# Patient Record
Sex: FEMALE | Race: WHITE | NOT HISPANIC OR LATINO | Employment: FULL TIME | URBAN - METROPOLITAN AREA
[De-identification: names, ages, dates, MRNs, and addresses within clinical notes are randomized per-mention and may not be internally consistent; named-entity substitution may affect disease eponyms.]

---

## 2017-07-26 ENCOUNTER — AMBULATORY - HEALTHEAST (OUTPATIENT)
Dept: FAMILY MEDICINE | Facility: CLINIC | Age: 32
End: 2017-07-26

## 2017-07-26 ENCOUNTER — COMMUNICATION - HEALTHEAST (OUTPATIENT)
Dept: FAMILY MEDICINE | Facility: CLINIC | Age: 32
End: 2017-07-26

## 2017-07-26 DIAGNOSIS — D22.9 CHANGE IN MOLE: ICD-10-CM

## 2018-06-18 ENCOUNTER — COMMUNICATION - HEALTHEAST (OUTPATIENT)
Dept: FAMILY MEDICINE | Facility: CLINIC | Age: 33
End: 2018-06-18

## 2018-10-29 ENCOUNTER — RECORDS - HEALTHEAST (OUTPATIENT)
Dept: ADMINISTRATIVE | Facility: OTHER | Age: 33
End: 2018-10-29

## 2018-12-17 ENCOUNTER — OFFICE VISIT - HEALTHEAST (OUTPATIENT)
Dept: FAMILY MEDICINE | Facility: CLINIC | Age: 33
End: 2018-12-17

## 2018-12-17 DIAGNOSIS — E66.09 CLASS 1 OBESITY DUE TO EXCESS CALORIES WITHOUT SERIOUS COMORBIDITY WITH BODY MASS INDEX (BMI) OF 32.0 TO 32.9 IN ADULT: ICD-10-CM

## 2018-12-17 DIAGNOSIS — Z00.00 ROUTINE HISTORY AND PHYSICAL EXAMINATION OF ADULT: ICD-10-CM

## 2018-12-17 DIAGNOSIS — E66.811 CLASS 1 OBESITY DUE TO EXCESS CALORIES WITHOUT SERIOUS COMORBIDITY WITH BODY MASS INDEX (BMI) OF 32.0 TO 32.9 IN ADULT: ICD-10-CM

## 2018-12-17 LAB
CHOLEST SERPL-MCNC: 128 MG/DL
FASTING STATUS PATIENT QL REPORTED: YES
FASTING STATUS PATIENT QL REPORTED: YES
GLUCOSE BLD-MCNC: 93 MG/DL (ref 70–99)
HDLC SERPL-MCNC: 51 MG/DL
LDLC SERPL CALC-MCNC: 65 MG/DL
TRIGL SERPL-MCNC: 58 MG/DL
TSH SERPL DL<=0.005 MIU/L-ACNC: 1.05 UIU/ML (ref 0.3–5)

## 2018-12-17 ASSESSMENT — MIFFLIN-ST. JEOR: SCORE: 1692.4

## 2018-12-18 LAB
HPV SOURCE: NORMAL
HUMAN PAPILLOMA VIRUS 16 DNA: NEGATIVE
HUMAN PAPILLOMA VIRUS 18 DNA: NEGATIVE
HUMAN PAPILLOMA VIRUS FINAL DIAGNOSIS: NORMAL
HUMAN PAPILLOMA VIRUS OTHER HR: NEGATIVE
SPECIMEN DESCRIPTION: NORMAL

## 2019-04-23 ENCOUNTER — COMMUNICATION - HEALTHEAST (OUTPATIENT)
Dept: FAMILY MEDICINE | Facility: CLINIC | Age: 34
End: 2019-04-23

## 2019-04-23 ENCOUNTER — AMBULATORY - HEALTHEAST (OUTPATIENT)
Dept: OTHER | Facility: CLINIC | Age: 34
End: 2019-04-23

## 2019-04-29 ENCOUNTER — OFFICE VISIT - HEALTHEAST (OUTPATIENT)
Dept: FAMILY MEDICINE | Facility: CLINIC | Age: 34
End: 2019-04-29

## 2019-04-29 DIAGNOSIS — R91.8 PULMONARY NODULES: ICD-10-CM

## 2019-04-29 DIAGNOSIS — R07.89 ATYPICAL CHEST PAIN: ICD-10-CM

## 2019-04-29 DIAGNOSIS — K76.9 LIVER LESION: ICD-10-CM

## 2019-04-29 ASSESSMENT — MIFFLIN-ST. JEOR: SCORE: 1683.33

## 2019-05-29 ENCOUNTER — RECORDS - HEALTHEAST (OUTPATIENT)
Dept: ADMINISTRATIVE | Facility: OTHER | Age: 34
End: 2019-05-29

## 2019-09-04 ENCOUNTER — RECORDS - HEALTHEAST (OUTPATIENT)
Dept: ADMINISTRATIVE | Facility: OTHER | Age: 34
End: 2019-09-04

## 2019-09-12 ENCOUNTER — COMMUNICATION - HEALTHEAST (OUTPATIENT)
Dept: FAMILY MEDICINE | Facility: CLINIC | Age: 34
End: 2019-09-12

## 2019-09-13 ENCOUNTER — RECORDS - HEALTHEAST (OUTPATIENT)
Dept: GENERAL RADIOLOGY | Facility: CLINIC | Age: 34
End: 2019-09-13

## 2019-09-13 ENCOUNTER — OFFICE VISIT - HEALTHEAST (OUTPATIENT)
Dept: FAMILY MEDICINE | Facility: CLINIC | Age: 34
End: 2019-09-13
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
## 2019-09-13 DIAGNOSIS — T14.8XXA MUSCLE STRAIN: ICD-10-CM
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
## 2019-09-13 DIAGNOSIS — T14.8XXA OTHER INJURY OF UNSPECIFIED BODY REGION, INITIAL ENCOUNTER: ICD-10-CM

## 2019-09-16 ENCOUNTER — COMMUNICATION - HEALTHEAST (OUTPATIENT)
Dept: FAMILY MEDICINE | Facility: CLINIC | Age: 34
End: 2019-09-16

## 2019-09-26 ENCOUNTER — OFFICE VISIT - HEALTHEAST (OUTPATIENT)
Dept: PHYSICAL THERAPY | Facility: REHABILITATION | Age: 34
End: 2019-09-26
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
## 2019-09-26 DIAGNOSIS — M54.6 PAIN IN THORACIC SPINE: ICD-10-CM

## 2019-09-26 DIAGNOSIS — M54.2 CERVICAL SPINE PAIN: ICD-10-CM

## 2019-09-26 DIAGNOSIS — M54.50 LUMBAR SPINE PAIN: ICD-10-CM

## 2019-10-03 ENCOUNTER — OFFICE VISIT - HEALTHEAST (OUTPATIENT)
Dept: PHYSICAL THERAPY | Facility: REHABILITATION | Age: 34
End: 2019-10-03

## 2019-10-03 DIAGNOSIS — M54.6 PAIN IN THORACIC SPINE: ICD-10-CM

## 2019-10-03 DIAGNOSIS — M54.50 LUMBAR SPINE PAIN: ICD-10-CM

## 2019-10-03 DIAGNOSIS — M54.2 CERVICAL SPINE PAIN: ICD-10-CM
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
## 2019-10-08 ENCOUNTER — OFFICE VISIT - HEALTHEAST (OUTPATIENT)
Dept: PHYSICAL THERAPY | Facility: REHABILITATION | Age: 34
End: 2019-10-08

## 2019-10-08 DIAGNOSIS — M54.2 CERVICAL SPINE PAIN: ICD-10-CM
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
## 2019-10-08 DIAGNOSIS — M54.50 LUMBAR SPINE PAIN: ICD-10-CM

## 2019-10-08 DIAGNOSIS — M54.6 PAIN IN THORACIC SPINE: ICD-10-CM

## 2019-10-13 ENCOUNTER — RECORDS - HEALTHEAST (OUTPATIENT)
Dept: ADMINISTRATIVE | Facility: OTHER | Age: 34
End: 2019-10-13

## 2019-10-14 ENCOUNTER — OFFICE VISIT - HEALTHEAST (OUTPATIENT)
Dept: PHYSICAL THERAPY | Facility: REHABILITATION | Age: 34
End: 2019-10-14

## 2019-10-14 DIAGNOSIS — M54.50 LUMBAR SPINE PAIN: ICD-10-CM

## 2019-10-14 DIAGNOSIS — M54.2 CERVICAL SPINE PAIN: ICD-10-CM

## 2019-10-14 DIAGNOSIS — M54.6 PAIN IN THORACIC SPINE: ICD-10-CM
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
## 2019-10-17 ENCOUNTER — OFFICE VISIT - HEALTHEAST (OUTPATIENT)
Dept: PHYSICAL THERAPY | Facility: REHABILITATION | Age: 34
End: 2019-10-17
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
## 2019-10-17 DIAGNOSIS — M54.50 LUMBAR SPINE PAIN: ICD-10-CM

## 2019-10-17 DIAGNOSIS — M54.2 CERVICAL SPINE PAIN: ICD-10-CM

## 2019-10-17 DIAGNOSIS — M54.6 PAIN IN THORACIC SPINE: ICD-10-CM

## 2019-10-21 ENCOUNTER — OFFICE VISIT - HEALTHEAST (OUTPATIENT)
Dept: PHYSICAL THERAPY | Facility: REHABILITATION | Age: 34
End: 2019-10-21
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
## 2019-10-21 DIAGNOSIS — M54.50 LUMBAR SPINE PAIN: ICD-10-CM

## 2019-10-21 DIAGNOSIS — M54.2 CERVICAL SPINE PAIN: ICD-10-CM

## 2019-10-21 DIAGNOSIS — M54.6 PAIN IN THORACIC SPINE: ICD-10-CM

## 2019-10-29 ENCOUNTER — HOSPITAL ENCOUNTER (OUTPATIENT)
Dept: CT IMAGING | Facility: HOSPITAL | Age: 34
Discharge: HOME | End: 2019-10-29

## 2019-10-29 ENCOUNTER — HOSPITAL ENCOUNTER (OUTPATIENT)
Dept: MRI IMAGING | Facility: HOSPITAL | Age: 34
Discharge: HOME | End: 2019-10-29

## 2019-10-29 DIAGNOSIS — K76.9 LIVER LESION: ICD-10-CM

## 2019-10-29 DIAGNOSIS — R91.8 PULMONARY NODULES: ICD-10-CM

## 2019-11-05 ENCOUNTER — OFFICE VISIT - HEALTHEAST (OUTPATIENT)
Dept: FAMILY MEDICINE | Facility: CLINIC | Age: 34
End: 2019-11-05

## 2019-11-05 DIAGNOSIS — K76.89 LIVER CYST: ICD-10-CM

## 2019-11-05 DIAGNOSIS — R91.8 PULMONARY NODULES: ICD-10-CM

## 2019-11-05 DIAGNOSIS — Z00.00 ENCOUNTER FOR MEDICAL EXAMINATION TO ESTABLISH CARE: ICD-10-CM

## 2019-11-07 ENCOUNTER — OFFICE VISIT - HEALTHEAST (OUTPATIENT)
Dept: PHYSICAL THERAPY | Facility: REHABILITATION | Age: 34
End: 2019-11-07

## 2019-11-07 DIAGNOSIS — M54.6 PAIN IN THORACIC SPINE: ICD-10-CM
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
## 2019-11-07 DIAGNOSIS — M54.50 LUMBAR SPINE PAIN: ICD-10-CM

## 2019-11-07 DIAGNOSIS — M54.2 CERVICAL SPINE PAIN: ICD-10-CM

## 2019-12-05 ENCOUNTER — COMMUNICATION - HEALTHEAST (OUTPATIENT)
Dept: PHYSICAL THERAPY | Facility: REHABILITATION | Age: 34
End: 2019-12-05

## 2021-02-23 ENCOUNTER — COMMUNICATION - HEALTHEAST (OUTPATIENT)
Dept: FAMILY MEDICINE | Facility: CLINIC | Age: 36
End: 2021-02-23

## 2021-03-29 ENCOUNTER — OFFICE VISIT - HEALTHEAST (OUTPATIENT)
Dept: FAMILY MEDICINE | Facility: CLINIC | Age: 36
End: 2021-03-29

## 2021-03-29 DIAGNOSIS — N36.8: ICD-10-CM

## 2021-03-29 DIAGNOSIS — Z00.01 ENCOUNTER FOR GENERAL ADULT MEDICAL EXAMINATION WITH ABNORMAL FINDINGS: ICD-10-CM

## 2021-03-29 ASSESSMENT — MIFFLIN-ST. JEOR: SCORE: 1651.58

## 2021-04-02 ENCOUNTER — COMMUNICATION - HEALTHEAST (OUTPATIENT)
Dept: FAMILY MEDICINE | Facility: CLINIC | Age: 36
End: 2021-04-02

## 2021-05-28 NOTE — TELEPHONE ENCOUNTER
New Appointment Needed  What is the reason for the visit:    Inpatient/ED Follow Up Appt Request  At what hospital or facility were you seen?:   What is the reason you were seen?: chest pain  What date were you admitted?: date: 04/22/169  What date were you discharged?: date: 04/22/19  What was the recommended timeframe for your follow up appointment?: 1 to 2 days follow up   Provider Preference: PCP only  How soon do you need to be seen?: today if possible   Waitlist offered?: No  Okay to leave a detailed message:  Yes

## 2021-05-28 NOTE — PROGRESS NOTES
ASSESSMENT/PLAN  1. Atypical chest pain  ER note and diagnostics reviewed from 2019.  See below regarding follow-up of abnormal findings imaging.  Symptoms improving--patient will follow and return with any changes/new complaints.    2. Pulmonary nodules  Largest 6 x 2 mm left upper lobe; per current guidelines, repeat CT at 6 months and then again at 18 to 24 months.  Future order for CT placed.  6.2 and- CT Chest Without Contrast; Future    3. Liver lesion  Recommendation was to do MRI in 3 to 6 months.  Patient would like to try this and with doing follow-up CT as above.  Future order placed.  - MR Liver With Without Contrast; Future          Pt states an understanding and agrees to the above plan.  Return in about 8 months (around 2019) for Annual physical.    Much or all of the text in this note was generated through the use of Dragon Dictate voice-to-text software. Errors in spelling or words which seem out of context are unintentional.   Sound alike errors, in particular, may have escaped editing.        SUBJECTIVE:   Chief Complaint   Patient presents with     Hospital Visit Follow Up     ED f/u Chest pain, [facility], , symptoms have improved, still has some back pain     Yue Rivera 33 y.o. female    Current Outpatient Medications   Medication Sig Dispense Refill     MULTIVITAMIN ORAL Take by mouth.       No current facility-administered medications for this visit.      Allergies: Patient has no known allergies.   No LMP recorded. (Menstrual status: Contraception).    HPI:   Yue is a 33-year-old  3 para 3 comes in today for follow-up after being seen in the ED 2019.  Patient had pain in her back between her shoulder blades that increased after doing a workout and started having chest symptoms.  She admits she had just completed a CPR course and was aware that female disease may present atypically.  She decided with increasing discomfort she would go to the ER for further  "evaluation.  She was monitored in the ER and had labs completed including CBC, CMP, troponin, lipase and EKG which were all normal.  They did do imaging of her chest which discovered some pulmonary nodules as well as a liver lesions but nothing to explain the chest pain.  Patient reports chest pain resolved back pain still mildly there.  She feels she likely\" tweaked\" something with workouts.  She has been working out with a fairly vigorous exercise routine a couple times a week over the last 6 weeks.  She is backing off of Bactrim but symptoms resolved at this point.  Pain does not require any type of analgesic medication.  She is having no issues with breathing, no abdominal pain, no other new symptoms.  And is not on any new medication and denies any history of injury or trauma.    ROS: negative except as per HPI    OBJECTIVE:   The patient appears well, alert, oriented x 3, in no distress.  /62 (Patient Site: Left Arm, Patient Position: Sitting, Cuff Size: Adult Large)   Pulse 70   Temp 96.7  F (35.9  C) (Oral)   Ht 5' 8\" (1.727 m)   Wt 209 lb 6.4 oz (95 kg)   BMI 31.84 kg/m      Lungs: clear, good air entry, no wheezes, rhonchi or rales.   Cardiac: S1 and S2 normal, no murmurs, regular rate and rhythm.     15 minutes spent face-to-face with patient, greater than 50% of this in counseling regarding the above, making plans for follow-up care, and medication management.      "

## 2021-05-28 NOTE — TELEPHONE ENCOUNTER
Patient came to clinic and scheduled an appt with Dr Singh 04/29/19 at 9:30AM, appts were offered for tomorrow 04/24/19 patient declined as she wanted to see Dr Singh.
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
## 2021-06-01 NOTE — TELEPHONE ENCOUNTER
Happy to see her to establish care. If not urgent then can just schedule establish care physical in future when I have openings. If more urgent let me know

## 2021-06-02 ENCOUNTER — RECORDS - HEALTHEAST (OUTPATIENT)
Dept: ADMINISTRATIVE | Facility: CLINIC | Age: 36
End: 2021-06-02

## 2021-06-02 VITALS — HEIGHT: 68 IN | BODY MASS INDEX: 32.04 KG/M2 | WEIGHT: 211.4 LBS
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
## 2021-06-03 VITALS
TEMPERATURE: 98.3 F | WEIGHT: 207 LBS | OXYGEN SATURATION: 100 % | DIASTOLIC BLOOD PRESSURE: 68 MMHG | BODY MASS INDEX: 31.47 KG/M2 | HEART RATE: 72 BPM | RESPIRATION RATE: 12 BRPM | SYSTOLIC BLOOD PRESSURE: 116 MMHG

## 2021-06-03 VITALS
SYSTOLIC BLOOD PRESSURE: 104 MMHG | DIASTOLIC BLOOD PRESSURE: 59 MMHG | BODY MASS INDEX: 31.63 KG/M2 | HEART RATE: 68 BPM | WEIGHT: 208 LBS | RESPIRATION RATE: 16 BRPM | OXYGEN SATURATION: 99 % | TEMPERATURE: 97.7 F

## 2021-06-03 VITALS — HEIGHT: 68 IN | WEIGHT: 209.4 LBS | BODY MASS INDEX: 31.74 KG/M2
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
## 2021-06-03 NOTE — PROGRESS NOTES
ASSESSMENT & PLAN:  Yue was seen today for establish care.    Diagnoses and all orders for this visit:    Encounter for medical examination to establish care    Pulmonary nodules  -     CT Chest Without Contrast; Future    Liver cyst     -Reviewed all health history at this time.  We will plan to follow-up with patient in the next 6 months for annual physical otherwise CT scan to follow-up on pulmonary nodules was ordered that she can have done by the end of the year.  Reassured her on the liver cysts.  She will let me know if she has any further questions or concerns    There are no Patient Instructions on file for this visit.     Orders Placed This Encounter   Procedures     CT Chest Without Contrast     Standing Status:   Future     Standing Expiration Date:   11/5/2020     Order Specific Question:   Reason for Exam (Describe Symptoms):     Answer:   follow up pulmonary nodule from april 2019     Order Specific Question:   Is the patient pregnant?     Answer:   No     Order Specific Question:   Can the procedure be changed per Radiologist protocol?     Answer:   Yes     Order Specific Question:   If this is a diagnostic procedure, have the patient's age and recent imaging history been considered?     Answer:   Yes     There are no discontinued medications.    No follow-ups on file.     CHIEF COMPLAINT:  Chief Complaint   Patient presents with     \[facility]\"" Care     Review MRI and CT from October        HISTORY OF PRESENT ILLNESS:  Yue is a 33 y.o. female presenting to the clinic today for establish care.  She was previously a patient of Dr. Soumya Singh.  Her  and boys all see Dr. Keith.  She had been seen in the emergency room in the last 12 months for chest discomfort that was felt to be musculoskeletal.  A CTA was performed and incidental liver cyst and pulmonary nodules were seen.  She did have follow-up chest CT several days later that question a possible nerve sheath shadowing however the  nodule was measuring at 6 x 2 mm.  She is a non-smoker and did not grow up in a smoking household.  Dedicated liver MRI follow-up revealed likely benign cysts.   She has no other concerns at this time.  Does not take any medications.  Has an IUD Mirena in place    Chest Pain: Patient had chest pain on 4/23/19 due to muscular issues at the ED. She was training for a triathon.     Liver Cyst: Patient had a cyst was on her liver but it was not significant. There were spots in her lungs which also had no significance. She has umbilical hernia that causes no pain.     Pulmonary nodules- recommended follow-up was in 6 months and then again in 12 to 18 months.    Contraceptive: The Mirena placed in November 2016. She had no issues or mestrual cycles with it.       She is not yet due for a physical at this time.  Has had normal Pap smears.  We did not do physical exam today as she wanted to get to her son's appointment who is being seen at the same time with Dr. Keith  REVIEW OF SYSTEMS:   Please read above in the HPI. Complete head to toe review of systems is otherwise negative except as above.  She has no pertinent family history and no allergies.  No history of any surgeries   PFSH:  History reviewed. No pertinent surgical history.  Her family history includes Diabetes in her father; Diabetes type II in her maternal grandmother; Hyperlipidemia in her maternal grandmother; Hypertension in her paternal grandmother; Osteoporosis in her paternal grandmother; Thyroid disease in her mother.  She  reports that she has never smoked. She has never used smokeless tobacco. She reports that she does not drink alcohol or use drugs. Did not grew up in a smoking household. She has 3 sons that ages 7,4, and 3. She denies complications in delivery, gestational diabetes, or hypertension. Her 4 y.o. son had a fibrillar seizure recently.     TOBACCO USE:  Social History     Tobacco Use   Smoking Status Never Smoker   Smokeless Tobacco  Never Used        VITALS:  Vitals:    11/05/19 1529   BP: 104/59   Pulse: 68   Resp: 16   Temp: 97.7  F (36.5  C)   TempSrc: Oral   SpO2: 99%   Weight: 208 lb (94.3 kg)     Wt Readings from Last 3 Encounters:   11/05/19 208 lb (94.3 kg)   09/13/19 207 lb (93.9 kg)   04/29/19 209 lb 6.4 oz (95 kg)     Body mass index is 31.63 kg/m .  No LMP recorded. (Menstrual status: Contraception).       MEDICATIONS:  Current Outpatient Medications   Medication Sig Dispense Refill     levonorgestrel (MIRENA UTRN) by Intrauterine route.       MULTIVITAMIN ORAL Take by mouth.       No current facility-administered medications for this visit.        PHYSICAL EXAM:  GENERAL:  Reveals an alert mildly anxious 33 y.o. female in NAD.  Vitals:  Per nursing notes.  EYES: PERRLA. Extraocular movements intact. Normal conjunctiva and lids.   PSYCH:  Mood appropriate, memory intact.     DATA REVIEWED:  ADDITIONAL HISTORY SUMMARIZED (2): Reviewed ED of chest pain, 4/23/19.   DECISION TO OBTAIN EXTRA INFORMATION (1): None.   RADIOLOGY TESTS (1): Reviewed CT chest for chest pain, 4/29/19.   LABS (1): None.  MEDICINE TESTS (1): None.  INDEPENDENT REVIEW (2 each): None.     The visit lasted a total of 14 minutes face to face with the patient. Over 50% of the time was spent counseling and educating the patient about chest pain, liver cyst, and contraceptive.     IDamaris, am scribing for and in the presence of Dr. Coe.  IDr. Coe, personally performed the services described in this documentation, as scribed by Damaris Brenner in my presence, and it is both accurate and complete.     Total data points: 3

## 2021-06-04 NOTE — TELEPHONE ENCOUNTER
Called and left VM regarding NS #5 (some were late cancels).  Left message regarding our policy, if she would like to continue she needs a new referral from her physician.  I will cancel her remaining appointments at this time.

## 2021-06-05 ENCOUNTER — RECORDS - HEALTHEAST (OUTPATIENT)
Dept: FAMILY MEDICINE | Facility: CLINIC | Age: 36
End: 2021-06-05

## 2021-06-05 VITALS
DIASTOLIC BLOOD PRESSURE: 76 MMHG | TEMPERATURE: 97.9 F | HEIGHT: 68 IN | RESPIRATION RATE: 16 BRPM | SYSTOLIC BLOOD PRESSURE: 113 MMHG | OXYGEN SATURATION: 98 % | HEART RATE: 59 BPM | BODY MASS INDEX: 30.68 KG/M2 | WEIGHT: 202.4 LBS

## 2021-06-05 DIAGNOSIS — Z33.1 PREGNANT STATE, INCIDENTAL: ICD-10-CM

## 2021-06-16 PROBLEM — E66.09 CLASS 1 OBESITY DUE TO EXCESS CALORIES WITHOUT SERIOUS COMORBIDITY WITH BODY MASS INDEX (BMI) OF 32.0 TO 32.9 IN ADULT: Status: ACTIVE | Noted: 2018-12-17

## 2021-06-16 PROBLEM — K76.89 LIVER CYST: Status: ACTIVE | Noted: 2019-11-05

## 2021-06-16 PROBLEM — E66.811 CLASS 1 OBESITY DUE TO EXCESS CALORIES WITHOUT SERIOUS COMORBIDITY WITH BODY MASS INDEX (BMI) OF 32.0 TO 32.9 IN ADULT: Status: ACTIVE | Noted: 2018-12-17

## 2021-06-16 PROBLEM — N36.8: Status: ACTIVE | Noted: 2021-04-02

## 2021-06-16 PROBLEM — R91.8 PULMONARY NODULES: Status: ACTIVE | Noted: 2019-11-05

## 2021-06-16 NOTE — PROGRESS NOTES
FEMALE ADULT PREVENTIVE EXAM    CHIEF COMPLAINT:  Female preventive exam.    SUBJECTIVE:  Yue Rivera is a 35 y.o. female.  Changing moles on face.  No family hx skin cancers.  -sunscreen  IUD 12/2016  .  She wants to discuss when she should have it removed and replaced.  Son epilepsy had seizure before dx last visit.    One abnormal Pap smear distant past 2013.   She  has a past medical history of Abnormal Pap Smear Of Cervix and Liver cyst (11/5/2019).    Lab Results   Component Value Date    WBC 10.0 04/23/2019    HGB 13.8 04/23/2019    HCT 40.9 04/23/2019    MCV 87 04/23/2019     04/23/2019     04/23/2019    K 4.0 04/23/2019    BUN 11 04/23/2019     Lab Results   Component Value Date    CHOL 128 12/17/2018    HDL 51 12/17/2018    LDLCALC 65 12/17/2018    TRIG 58 12/17/2018     Lab Results   Component Value Date    TSH 1.05 12/17/2018     BP Readings from Last 3 Encounters:   03/29/21 113/76   11/05/19 104/59   09/13/19 116/68       Surgeries:  No past surgical history on file.    Family History:  Her family history includes Diabetes in her father; Diabetes type II in her maternal grandmother; Hyperlipidemia in her maternal grandmother; Hypertension in her paternal grandmother; Osteoporosis in her paternal grandmother; Thyroid disease in her mother.    Social History:  She  reports that she has never smoked. She has never used smokeless tobacco. She reports that she does not drink alcohol or use drugs.    Medications:    Current Outpatient Medications:      levonorgestrel (MIRENA UTRN), by Intrauterine route., Disp: , Rfl:      MULTIVITAMIN ORAL, Take by mouth., Disp: , Rfl:   HELD MEDICATIONS: None.    Allergies:  No latex allergies.  No Known Allergies    RISK BEHAVIOR & HEALTH HABITS  1 abnormal Pap smear.  Not yet due for mammography.  Recommend exercise    Guns: NO  Sun Screen: YES  Dental Care: YES    REVIEW OF SYSTEMS:  Complete head to toe review of systems is otherwise negative  except as above.    OBJECTIVE:  VITAL SIGNS:    Vitals:    03/29/21 1142   BP: 113/76   Pulse: (!) 59   Resp: 16   Temp: 97.9  F (36.6  C)   SpO2: 98%     GENERAL:  Patient alert, in no acute distress.  EYES: PERRLA. Extraoccular movements intact, pupils equal, reactive to light and accommodation.  Normal conjunctiva and lids.   ENT:  Hearing grossly normal.  Normal appearance to ears and nose.  Bilateral TM s, external canals, oropharynx normal. Normal lips, gums and teeth.  Normal nasal mucosa, septum and turbinates.  NECK:  Supple, without thyromegaly or mass.  RESP:  Clear to auscultation without crackles, wheezes or distress.  Normal respiratory effort.   CV:  Regular rate and rhythm without murmurs, rubs or gallops.  Normal carotid, abdominal aorta, femoral and pedal pulses.  No varicosities or edema.  ABDOMEN:  Soft, non-tender, without hepatosplenomegaly, masses, or hernias.   BREASTS:  Nontender, without masses, nipple discharge, erythema, or axillary adenopathy.    :  Normal pelvic exam, including external genitalia with normal appearance and no lesions.  Normal vaginal exam, including no discharge and normal pelvic support, and no lesions.    There is mild enlargement of inferior urethra consistent with [place]'s cyst.  normal bladder, with no fullness, masses or tenderness.  Cervix well visualized, with normal appearance and no lesions or discharge.  Normal uterus, including normal size, shape, mobility with no tenderness.  Normal adnexa, including no nodules, masses or tenderness.  LYMPHATIC: Normal palpation of neck, groin and axilla..  No lymphadenopathy.  No bruising.  NEURO:  CN II-XII intact, motor & sensory function all intact.  DTR and reflexes normal.  PSYCHIATRIC:  Alert & oriented with normal mood and affect.  Good judgment and insight.  SKIN:  Normal inspection and palpation.  MUSCULOSKELETAL: Normal gait and station.  - Spine / Ribs / Pelvis: Normal inspection, ROM, stability and strength:  Spine, Head, Neck, Upper and Lower Extremities.    ASSESSMENT & PLAN  Yue was seen today for annual exam and nevus.    Diagnoses and all orders for this visit:    Encounter for general adult medical examination with abnormal findings    Cyst of [place]'s duct  -     Ambulatory referral to Urology    -Normal exam at this time.  Patient will message me when she is ready to have her IUD removed and replaced.  Referral placed for [place] duct cyst but patient is asymptomatic.  She could decide to hold off on that referral for a while if she would like.  Could consider warm sitz bath's.  Could also see OB/GYN if it became enlarged or obstructive.  No recent UTIs    General  Immunizations reviewed and updated .  Alcohol use, safety and moderation discussed.  Recommended adequate calcium intake/osteoporosis prevention.  Discussed colon cancer screening at age 50, 45 if -American.  Diet and exercise reviewed, including goal of aerobic exercise 30-90 minutes most days of the week, moderation of portions sizes, avoiding eating out and fast food and increase in fruits and vegetables.  Discussed safe sex practices.  Discussed & recommended seat belt (& motorcycle helmet) use.  Discussed & recommended smoke detector.  Discussed sun protection.  Discussed weight management.

## 2021-06-17 NOTE — PATIENT INSTRUCTIONS - HE
"Patient Instructions by Martha Fraire PT at 9/26/2019  2:30 PM     Author: Martha Fraire PT Service: -- Author Type: Physical Therapist    Filed: 9/26/2019  3:13 PM Encounter Date: 9/26/2019 Status: Signed    : Martha Fraire PT (Physical Therapist)         GO BETWEEN ICE/HEAT 15 MIN ICE, 15 MIN HEAT, 15 MIN ICE, 15 HEAT.     LOWER TRUNK ROTATIONS - LTR    Lying on your back with your knees bent, gently move your knees side-to-side.    Repeat 10-20 times in the morning when you get up           Heel Slides    Lying on your back with knees bent, brace your abdominals.  Slide one heel away, extend your leg and side as far as able, slide your heel back up.    Repeat 10x on each side.        SIDELYING TRUNK ROTATION - MODIFIED    While lying on your side with your knees bent,  slowly twist your upper body to the side and rotated your spine.    Repeat 5-10 times on each side.          ISOMETRIC FLEXION    Place your fingers on your forehead and gently push your head into your fingers.    Push for 5\" then RELAX.  Push each direction 4 ways around your head.      CERVICAL EXTENSION WITH TOWEL SUPPORT    Start with a small hand towel wrapped around the base of your skull and holding the ends of the towel forward as shown. To look up at the ceiling. Then, return to starting position.     Your hands should follow with your head during the movement.    Repeat 5x            "

## 2021-06-17 NOTE — PATIENT INSTRUCTIONS - HE
"Patient Instructions by Martha Fraire PT at 10/3/2019 12:00 PM     Author: Martha Fraire PT Service: -- Author Type: Physical Therapist    Filed: 10/3/2019 12:33 PM Encounter Date: 10/3/2019 Status: Signed    : Martha Fraire PT (Physical Therapist)        BRACE MARCHING    While lying on your back with your knees bent,  slowly raise up one foot a few inches and then set it back down.  Next, perform on your other leg.  Use your stomach muscles to keep your spine from moving.    Repeat 3 sets of 10x each      BRIDGING    While lying on your back, tighten your lower abdominals, squeeze your buttocks and then raise your buttocks off the floor/bed as creating a \"Bridge\" with your body.    Repeat 2 sets of 10, you may hold each 10 seconds            "

## 2021-06-17 NOTE — PATIENT INSTRUCTIONS - HE
Patient Instructions by Martha Fraire PT at 11/7/2019  1:00 PM     Author: Martha Fraire PT Service: -- Author Type: Physical Therapist    Filed: 11/7/2019  1:29 PM Encounter Date: 11/7/2019 Status: Signed    : Martha Fraire PT (Physical Therapist)        ELASTIC BAND SHOULDER INTERNAL ROTATION    While holding an elastic band at your side with your elbow bent, start with your hand away from your stomach, then pull the band towards your stomach. Keep your elbow near your side the entire time.    Do 10-15 repetitions.      ELASTIC BAND SHOULDER EXTERNAL ROTATION    While holding an elastic band at your side with your elbow bent, start with your hand near your stomach and then pull the band away. Keep your elbow at your side the entire time.    Do 10-15 repetitions.      SERRATUS WALL SLIDE - ELASTIC BAND    Place an elastic band around your arms at the level of your wrists as shown. Next, place your forearms and hands along a wall so that your elbows are bent and your arms point towards the ceiling.     Then, protract your shoulder blades forward and then slide your arms up the wall as shown.     Return to the original position and repeat.

## 2021-06-17 NOTE — PATIENT INSTRUCTIONS - HE
"Patient Instructions by Martha Fraire PT at 10/14/2019 12:00 PM     Author: Martha Fraire PT Service: -- Author Type: Physical Therapist    Filed: 10/14/2019 12:33 PM Encounter Date: 10/14/2019 Status: Signed    : Martha Fraire PT (Physical Therapist)        PRONE RETRACTION EXTENSION - PRONE I    Lying face down with your arms by your side, slowly move your arms upward towards the ceiling as you squeeze your shoulder blades downwards and towards your spine.    Repeat 10x hold each 5\"    PRONE T - BILATERAL - THUMBS UP    Lie face down with your elbow straight and arms out to the side. Next, set your scapula by retracting it towards your spine and downward towards your feet. Then, slowly raise your arms towards the ceiling keeping your elbow straight the entire time as shown.    Repeat 5 x, hold 5\"              "

## 2021-06-17 NOTE — PATIENT INSTRUCTIONS - HE
Patient Instructions by Michelle Taylor PT at 10/8/2019  2:30 PM     Author: Michelle Taylor PT Service: -- Author Type: Physical Therapist    Filed: 10/8/2019  2:59 PM Encounter Date: 10/8/2019 Status: Signed    : Michelle Taylor PT (Physical Therapist)        Right Cervical Rotation SNAG    -Begin seated or standing  -Place towel behind the neck at the level that is bothering you   -Using the Left hand, grab the end of the towel on the Right side   -With the Right hand, grab the other end of the towel so your arms are crossed (Right over Left)  - While turning your head toward the Right, use the Right hand to gently pull towel up on a diagonal   -Return to starting position  -Repeat

## 2021-06-19 ENCOUNTER — HEALTH MAINTENANCE LETTER (OUTPATIENT)
Age: 36
End: 2021-06-19

## 2021-06-19 NOTE — LETTER
Letter by Luis Cook MD at      Author: Luis Cook MD Service: -- Author Type: --    Filed:  Encounter Date: 9/16/2019 Status: (Other)         Yue Rivera  [address]  [facility] [address]             September 16, 2019         Dear Ms. Miguel,    Below are the results from your recent visit:    Resulted Orders   XR Thoracic Spine 2 VWS    Narrative    EXAM: XR CERVICAL SPINE 2 - 3 VWS, XR THORACIC SPINE 2 VWS  LOCATION: [facility]  DATE/TIME: 9/13/2019 12:19 PM    INDICATION: Neck and back pain after trauma  COMPARISON: CTA of the chest abdomen pelvis dated 04/23/2019  TECHNIQUE: Views of the spine were obtained and reviewed.    FINDINGS:  Minimal 6 degrees of dextroconvex curvature of the thoracic spine and mild reversal of the usual cervical lordosis, otherwise spinal alignment is maintained. No evidence of spondylolisthesis. No acute fracture. Minimal degenerative changes in lower   cervical spine.       Hi Yue:  Same as other report. Some change in normal curvature of the spine due to spasm but no fracture.    Please call with questions or contact us using [employer]    Sincerely,        Electronically signed by Luis Cook MD

## 2021-06-19 NOTE — LETTER
Letter by Luis Cook MD at      Author: Luis Cook MD Service: -- Author Type: --    Filed:  Encounter Date: 9/16/2019 Status: (Other)         Yue Rivera  [address]  [facility] [place] [address]             September 16, 2019         Dear Ms. Rivera,    Below are the results from your recent visit:    Resulted Orders   XR Cervical Spine 2 - 3 VWS    Narrative    EXAM: XR CERVICAL SPINE 2 - 3 VWS, XR THORACIC SPINE 2 VWS  LOCATION: [facility]  DATE/TIME: 9/13/2019 12:19 PM    INDICATION: Neck and back pain after trauma  COMPARISON: CTA of the chest abdomen pelvis dated 04/23/2019  TECHNIQUE: Views of the spine were obtained and reviewed.    FINDINGS:  Minimal 6 degrees of dextroconvex curvature of the thoracic spine and mild reversal of the usual cervical lordosis, otherwise spinal alignment is maintained. No evidence of spondylolisthesis. No acute fracture. Minimal degenerative changes in lower   cervical spine.       Hi Yue:  Your cervical spine xray was normal other than for the change in curvature we noted due to muscle spasm.    Please call with questions or contact us using [employer].    Sincerely,        Electronically signed by Luis Cook MD

## 2021-06-22 NOTE — PROGRESS NOTES
Assessment/Plan:    1. Routine history and physical examination of adult  Immunizations reviewed and utd--seasonal flu shot today.  Pap reviewed--last done .  We will repeat today with HPV testing.  Patient with distant history of ASCUS with high risk HPV.  Routine Dental and Eye care recommended  Discussed importance of regular exercise and appropriate calcium intake    - Gynecologic Cytology (PAP Smear)    2. Class 1 obesity due to excess calories without serious comorbidity with body mass index (BMI) of 32.0 to 32.9 in adult  Patient has struggled with losing weight post third baby.  She has had previous success with BX 90 and weight watchers and intends to get back on track.  We will check labs today--nonfasting--to rule out metabolic issues.  - Lipid Cascade FASTING  - Glucose    Pt states an understanding and agrees with the above plan.    The following high BMI interventions were performed this visit: encouragement to exercise and weight monitoring            Health Maintenance   Topic Date Due     ADVANCE DIRECTIVES DISCUSSED WITH PATIENT  2001     INFLUENZA VACCINE RULE BASED (1) 2018     PAP SMEAR  2019     TD 18+ HE  10/11/2026     TDAP ADULT ONE TIME DOSE  Completed         HPI    Patient is a 33 y.o. female presents for a physical exam.  Patient is  3 para 3, approximately 2 years post her last baby.  In general, she is healthy and had no significant health risks during pregnancy.  She is struggled to lose her weight post baby.  She is exercising once a week with a dance class but nothing else consistent.  She has not been following any type of diet plan and admits to some dietary indiscretions.  Patient has an IUD for birth control.  She has not had a menstrual cycle since shortly after placement and is quite pleased with this method.  She is uncertain about potential fourth pregnancy though thinks the likelihood is low.  She continues on a daily multivitamin.  IUD was  placed approximately 18 months ago and she denies any problems or concerns with it.  She does not routinely check her strings.  Patient denies any concerns about vaginal discharge, dyspareunia.  She has normal bowel and bladder function with only occasional stress incontinence issues.  She knows the benefit of doing Kegel exercise/core strengthening.  Stressors reviewed--patient started a new job in August but this seems to be going well.  3 children are healthy with note that her middle son did develop a nut allergy and they are following with allergist.  Patient feels marriage is going well and  is supportive.    The following portions of the patient's history were reviewed and updated as appropriate: allergies, current medications, past family history, past medical history, past social history, past surgical history and problem list.    Review of Systems  Pertinent items are noted in HPI.  A 12 point comprehensive review of systems was negative except as noted.    Immunization History   Administered Date(s) Administered     HPV Quadrivalent 08/26/2009, 05/26/2010, 09/06/2011     Hep A, historic 08/26/2009, 05/26/2010     Influenza, seasonal,quad inj 6-35 mos 09/07/2012     Tdap 09/06/2011, 12/22/2014, 10/11/2016     No results found for this or any previous visit (from the past 240 hour(s)).    Patient Active Problem List   Diagnosis     Class 1 obesity due to excess calories without serious comorbidity with body mass index (BMI) of 32.0 to 32.9 in adult     Family History   Problem Relation Age of Onset     Thyroid disease Mother      Diabetes Father      Hyperlipidemia Maternal Grandmother      Diabetes type II Maternal Grandmother      Hypertension Paternal Grandmother      Osteoporosis Paternal Grandmother      Social History     Socioeconomic History     Marital status:      Spouse name: Not on file     Number of children: Not on file     Years of education: Not on file     Highest education  "level: Not on file   Social Needs     Financial resource strain: Not on file     Food insecurity - worry: Not on file     Food insecurity - inability: Not on file     Transportation needs - medical: Not on file     Transportation needs - non-medical: Not on file   Occupational History     Not on file   Tobacco Use     Smoking status: Never Smoker     Smokeless tobacco: Never Used   Substance and Sexual Activity     Alcohol use: No     Drug use: No     Sexual activity: Yes     Partners: Male   Other Topics Concern     Not on file   Social History Narrative     Not on file       Objective:    /70 (Patient Site: Left Arm, Patient Position: Sitting, Cuff Size: Adult Regular)   Pulse 67   Temp (!) 96  F (35.6  C) (Oral)   Ht 5' 8\" (1.727 m)   Wt 211 lb 6.4 oz (95.9 kg)   LMP  (LMP Unknown)   SpO2 99%   Breastfeeding? No   BMI 32.14 kg/m    PHQ 9 and JOHANA 7 screening both=0    General Appearance:    Alert, cooperative, no distress, appears stated age   Head:    Normocephalic, without obvious abnormality, atraumatic   Eyes:    PERRL, conjunctiva/corneas clear, EOM's intact both eyes   Ears:    Normal TM's and external ear canals, both ears   Throat:   Lips, mucosa, and tongue normal; teeth and gums normal   Neck:   Supple, symmetrical, trachea midline, no adenopathy;     thyroid:  no enlargement/tenderness/nodules; no carotid    bruit or JVD   Back:     Symmetric, no curvature, no CVA tenderness   Lungs:     Clear to auscultation bilaterally, respirations unlabored   Chest Wall:    No tenderness or deformity    Heart:    Regular rate and rhythm, S1 and S2 normal, no murmur, rub   or gallop   Breast Exam:    No tenderness, masses, or nipple abnormality   Abdomen:     Soft, non-tender, bowel sounds active all four quadrants,     no masses, no organomegaly   Genitalia:   Normal external female genitalia.  Speculum isolates multiparous cervix.  IUD strings visible at the office with some clear thicker discharge " present.  Pap collected without difficulty.  Bimanual exam shows normal midline uterus without any cervical motion tenderness.  No adnexal masses or tenderness.   Rectal:   normal External rectal tissue.   Extremities:   Extremities normal, atraumatic, no cyanosis or edema   Pulses:   2+ and symmetric all extremities   Skin:   Skin color, texture, turgor normal, no rashes or lesions   Neurologic:   CNII-XII intact

## 2021-09-17 ENCOUNTER — TRANSFERRED RECORDS (OUTPATIENT)
Dept: HEALTH INFORMATION MANAGEMENT | Facility: CLINIC | Age: 36
End: 2021-09-17

## 2021-10-10 ENCOUNTER — HEALTH MAINTENANCE LETTER (OUTPATIENT)
Age: 36
End: 2021-10-10

## 2021-11-05 ENCOUNTER — TRANSFERRED RECORDS (OUTPATIENT)
Dept: HEALTH INFORMATION MANAGEMENT | Facility: CLINIC | Age: 36
End: 2021-11-05
Payer: COMMERCIAL

## 2021-11-23 ENCOUNTER — E-VISIT (OUTPATIENT)
Dept: FAMILY MEDICINE | Facility: CLINIC | Age: 36
End: 2021-11-23
Payer: COMMERCIAL

## 2021-11-23 DIAGNOSIS — Z20.822 CLOSE EXPOSURE TO 2019 NOVEL CORONAVIRUS: Primary | ICD-10-CM

## 2021-11-23 PROCEDURE — 99421 OL DIG E/M SVC 5-10 MIN: CPT | Performed by: PHYSICIAN ASSISTANT

## 2021-11-24 ENCOUNTER — LAB (OUTPATIENT)
Dept: LAB | Facility: CLINIC | Age: 36
End: 2021-11-24
Attending: PHYSICIAN ASSISTANT
Payer: COMMERCIAL

## 2021-11-24 DIAGNOSIS — Z20.822 CLOSE EXPOSURE TO 2019 NOVEL CORONAVIRUS: ICD-10-CM

## 2021-11-24 PROCEDURE — U0005 INFEC AGEN DETEC AMPLI PROBE: HCPCS

## 2021-11-24 PROCEDURE — U0003 INFECTIOUS AGENT DETECTION BY NUCLEIC ACID (DNA OR RNA); SEVERE ACUTE RESPIRATORY SYNDROME CORONAVIRUS 2 (SARS-COV-2) (CORONAVIRUS DISEASE [COVID-19]), AMPLIFIED PROBE TECHNIQUE, MAKING USE OF HIGH THROUGHPUT TECHNOLOGIES AS DESCRIBED BY CMS-2020-01-R: HCPCS

## 2021-11-24 NOTE — PATIENT INSTRUCTIONS
"  Dear Yue Rivera,    Based on your exposure to COVID-19 (coronavirus), we would like to test you for this virus. I have placed an order for this test.The best time for testing is 5-7 days after the exposure.    How to schedule:  Go to your [facility] home page and scroll down to the section that says  You have an appointment that needs to be scheduled  and click the large green button that says  Schedule Now  and follow the steps to find the next available opening.     If you are unable to complete these [facility] scheduling steps, please call [phone] to schedule your testing.     Return to work/school/ guidance:   For people with high risk exposures outside the home    Please let your workplace manager and staffing office know when your quarantine ends.     We can not give you an exact date as it depends on the information below. You can calculate this on your own or work with your manager/staffing office to calculate this. (For example if you were exposed on 10/4, you would have to quarantine for 14 full days. That would be through 10/18. You could return on 10/19.)    Quarantine Guidelines:  Patients (\"contacts\") who have been in close prolonged contact of an infected person(s) (within six feet for at least 15 minutes within a 24 hour period), and remain asymptomatic should enter quarantine based on the following options:    14-day quarantine period (this remains the CDC recommendation for the greatest protection against spread of COVID-19) OR    Minimum 7-day quarantine with negative RT-PCR test collected on day 5 or later OR    10-day quarantine with no test  Quarantine Guideline exceptions are as follows:    People who have been fully vaccinated do not need to quarantine if the exposure was at least 2 weeks after the last vaccination. This includes vaccinated health care workers.    Not fully vaccinated and unvaccinated Individuals who work in health care, congregate care, or congregate living " should be off work for 14 days from their last date of exposure. Community activities for this group can be resumed based on options above. Fully vaccinated individuals in this group do not need to quarantine from work after exposure.    Not fully vaccinated and unvaccinated people whose high-risk exposure was a household member should always quarantine for 14 days from their last date of exposure. Fully vaccinated people in this category do not need to quarantine.    Not fully vaccinated or unvaccinated residents of congregate care and congregate living settings should always quarantine for 14 days from their last date of exposure. Fully vaccinated residents do not need to quarantine.  Note: If you have ongoing exposure to the covid positive person, this quarantine period may be more than 14 days. (For example, if you are continued to be exposed to your child who tested positive and cannot isolate from them, then the quarantine of 7-14 days can't start until your child is no longer contagious. This is typically 10 days from onset of the child's symptoms. So the total duration may be 17-24 days in this case.)    You should continue symptom monitoring until day 14 post-exposure. If you develop signs or symptoms of COVID-19, isolate and get tested (even if you have been tested already).    How to quarantine:   Stay home and away from others. Don't go to school or anywhere else. Generally quarantine means staying home from work but there are some exceptions to this. Please contact your workplace.  No hugging, kissing or shaking hands.  Don't let anyone visit.  Cover your mouth and nose with a mask, tissue or washcloth to avoid spreading germs.  Wash your hands and face often. Use soap and water.    What are the symptoms of COVID-19?  The most common symptoms are cough, fever and trouble breathing. Less common symptoms include headache, body aches, fatigue (feeling very tired), chills, sore throat, stuffy or runny nose,  diarrhea (loose poop), loss of taste or smell, belly pain, and nausea or vomiting (feeling sick to your stomach or throwing up).  After 14 days, if you have still don't have symptoms, you likely don't have this virus.  If you develop symptoms, follow these guidelines.  If you're normally healthy: Please start another eVisit.  If you have a serious health problem (like cancer, heart failure, an organ transplant or kidney disease): Call your specialty clinic. Let them know that you might have COVID-19.    Where can I get more information?  [facility] - About COVID-19: [email or web address]  CDC - What to Do If You're Sick: www.cdc.gov/coronavirus/2019-ncov/about/steps-when-sick.html  CDC - Ending Home Isolation: www.cdc.gov/coronavirus/2019-ncov/hcp/disposition-in-home-patients.html  CDC - Caring for Someone: www.cdc.gov/coronavirus/2019-ncov/if-you-are-sick/care-for-someone.html  [facility] clinical trials (COVID-19 research studies): clinicalaffairs.[facility]-clinical-trials  Below are the COVID-19 hotlines at the [place] Department of Health ([facility]). Interpreters are available.  For health questions: Call 682-213-2946 or 1-284.485.6052 (7 a.m. to 7 p.m.)  For questions about schools and childcare: Call 300-811-8970 or [phone] (7 a.m. to 7 p.m.)

## 2021-11-25 LAB — SARS-COV-2 RNA RESP QL NAA+PROBE: NEGATIVE

## 2021-12-07 ENCOUNTER — TRANSFERRED RECORDS (OUTPATIENT)
Dept: HEALTH INFORMATION MANAGEMENT | Facility: CLINIC | Age: 36
End: 2021-12-07
Payer: COMMERCIAL

## 2021-12-10 ENCOUNTER — LAB (OUTPATIENT)
Dept: LAB | Facility: CLINIC | Age: 36
End: 2021-12-10
Attending: FAMILY MEDICINE
Payer: COMMERCIAL

## 2021-12-10 DIAGNOSIS — R09.81 NASAL CONGESTION: ICD-10-CM

## 2021-12-10 DIAGNOSIS — R09.81 NASAL CONGESTION: Primary | ICD-10-CM

## 2021-12-10 PROCEDURE — U0005 INFEC AGEN DETEC AMPLI PROBE: HCPCS

## 2021-12-10 PROCEDURE — U0003 INFECTIOUS AGENT DETECTION BY NUCLEIC ACID (DNA OR RNA); SEVERE ACUTE RESPIRATORY SYNDROME CORONAVIRUS 2 (SARS-COV-2) (CORONAVIRUS DISEASE [COVID-19]), AMPLIFIED PROBE TECHNIQUE, MAKING USE OF HIGH THROUGHPUT TECHNOLOGIES AS DESCRIBED BY CMS-2020-01-R: HCPCS

## 2021-12-11 LAB — SARS-COV-2 RNA RESP QL NAA+PROBE: NEGATIVE

## 2021-12-20 DIAGNOSIS — Z20.822 EXPOSURE TO 2019 NOVEL CORONAVIRUS: Primary | ICD-10-CM

## 2021-12-21 ENCOUNTER — LAB (OUTPATIENT)
Dept: LAB | Facility: CLINIC | Age: 36
End: 2021-12-21
Attending: FAMILY MEDICINE
Payer: COMMERCIAL

## 2021-12-21 DIAGNOSIS — Z20.822 EXPOSURE TO 2019 NOVEL CORONAVIRUS: ICD-10-CM

## 2021-12-21 PROCEDURE — U0003 INFECTIOUS AGENT DETECTION BY NUCLEIC ACID (DNA OR RNA); SEVERE ACUTE RESPIRATORY SYNDROME CORONAVIRUS 2 (SARS-COV-2) (CORONAVIRUS DISEASE [COVID-19]), AMPLIFIED PROBE TECHNIQUE, MAKING USE OF HIGH THROUGHPUT TECHNOLOGIES AS DESCRIBED BY CMS-2020-01-R: HCPCS

## 2021-12-21 PROCEDURE — U0005 INFEC AGEN DETEC AMPLI PROBE: HCPCS

## 2021-12-22 LAB — SARS-COV-2 RNA RESP QL NAA+PROBE: NEGATIVE

## 2022-05-21 ENCOUNTER — HEALTH MAINTENANCE LETTER (OUTPATIENT)
Age: 37
End: 2022-05-21

## 2022-09-02 ENCOUNTER — TELEPHONE (OUTPATIENT)
Dept: FAMILY MEDICINE | Facility: CLINIC | Age: 37
End: 2022-09-02

## 2022-09-02 NOTE — TELEPHONE ENCOUNTER
Please call this patient to confirm that she is coming in on Tuesday for IUD removal and replacement. If so , advise her to take ibuprofen 600mg prior.  If not, please cancel and place HOLD on my schedule as I have another patient I am trying to get in.

## 2022-09-02 NOTE — TELEPHONE ENCOUNTER
Patient called back and does want to have the old one removed and a new one put in. Informed about taking 600MG ibuprofen prior to appointment.

## 2022-09-02 NOTE — TELEPHONE ENCOUNTER
Left message to call back for: upcoming appt  Information to relay to patient: LMTCB, Please see message below from provider.

## 2022-09-06 ENCOUNTER — OFFICE VISIT (OUTPATIENT)
Dept: FAMILY MEDICINE | Facility: CLINIC | Age: 37
End: 2022-09-06
Payer: COMMERCIAL

## 2022-09-06 VITALS
WEIGHT: 200.6 LBS | TEMPERATURE: 98.4 F | HEART RATE: 72 BPM | OXYGEN SATURATION: 97 % | SYSTOLIC BLOOD PRESSURE: 113 MMHG | BODY MASS INDEX: 30.5 KG/M2 | DIASTOLIC BLOOD PRESSURE: 65 MMHG | RESPIRATION RATE: 16 BRPM

## 2022-09-06 DIAGNOSIS — Z30.433 ENCOUNTER FOR REMOVAL AND REINSERTION OF INTRAUTERINE CONTRACEPTIVE DEVICE: Primary | ICD-10-CM

## 2022-09-06 DIAGNOSIS — Z12.4 SCREENING FOR CERVICAL CANCER: ICD-10-CM

## 2022-09-06 PROCEDURE — 88142 CYTOPATH C/V THIN LAYER: CPT | Performed by: FAMILY MEDICINE

## 2022-09-06 PROCEDURE — 58301 REMOVE INTRAUTERINE DEVICE: CPT | Performed by: FAMILY MEDICINE

## 2022-09-06 PROCEDURE — 58300 INSERT INTRAUTERINE DEVICE: CPT | Performed by: FAMILY MEDICINE

## 2022-09-06 PROCEDURE — 87624 HPV HI-RISK TYP POOLED RSLT: CPT | Performed by: FAMILY MEDICINE

## 2022-09-06 NOTE — PROGRESS NOTES
{PROVIDER CHARTING PREFERENCE:530411}    Subjective   Yue is a 36 year old{ACCOMPANIED BY STATEMENT (Optional):773834}, presenting for the following health issues:  IUD (Removal and replacment of Mirena IUD)      HPI     {SUPERLIST (Optional):555064}  {additonal problems for provider to add (Optional):647293}    Review of Systems   {ROS COMP (Optional):030354}      Objective    There were no vitals taken for this visit.  There is no height or weight on file to calculate BMI.  Physical Exam   {Exam List (Optional):751385}    {Diagnostic Test Results (Optional):198480}    {AMBULATORY ATTESTATION (Optional):540334}            [unfilled]  @[email or web address]@

## 2022-09-06 NOTE — PROGRESS NOTES
SUBJECTIVE:    Is a pregnancy test required: No.  Was a consent obtained?  Yes    Subjective: Yue Rivera is a 36 year old  presents for IUD and desires Mirena type IUD.  She requests removal of the IUD because the IUD effectiveness has .  She has not had any complications with her previous IUD.  It has lost its efficacy.  Hardly ever had a period with this 1.   Of note she also has a [place]'s duct that never was followed up with because of COVID.  It is large approximately 2 cm but does not give her any problems and does not obstruct urination.  No signs of infection.  None painless.    Patient has been given the opportunity to ask questions about all forms of birth control, including all options appropriate for Yue Rivera. Discussed that no method of birth control, except abstinence is 100% effective against pregnancy or sexually transmitted infection.     Yue Rivera understands she may have the IUD removed at any time. IUD should be removed by a health care provider and the current IUD will be removed today.    The entire removal and insertion procedure was reviewed with the patient, including care after placement.    Today's PHQ-2 Score:   PHQ-2 (  [employer]) 2022   Q1: Little interest or pleasure in doing things 0   Q2: Feeling down, depressed or hopeless 0   PHQ-2 Score 0   Q1: Little interest or pleasure in doing things Not at all   Q2: Feeling down, depressed or hopeless Not at all   PHQ-2 Score 0       PROCEDURE:    Premedicated with ibuprofen.  A speculum exam was performed and the cervix was visualized. The IUD string was visualized. Using ring forceps, the string  was grasped and the IUD removed intact.    Under sterile technique, cervix was visualized with speculum and prepped with Betadine solution swab x 3. Tenaculum was placed for stability. The uterus was gently straightened and sounded to 8.0 cm. IUD prepared for placement, and IUD inserted according to  's instructions without difficulty or significant ressitance, and deployed at the fundus. The strings were visualized and trimmed to 4.0 cm from the external os. Tenaculum was removed and hemostasis noted. Speculum removed.  Patient tolerated procedure well.    Exam -large 2cm skenes duct cyst, non tender  Pap smear obtained.     Lot # [id number]  Exp: 2024 Mar    EBL: minimal    Complications: none      POST PROCEDURE:    Given 's handouts, including when to have IUD removed, list of danger s/sx, side effects and follow up recommended. Encouraged condom use for prevention of STD. Advised to call for any fever, for prolonged or severe pain or bleeding, abnormal vaginal dischage, or unable to palpate strings. She was advised to use pain medications (ibuprofen) as needed for mild to moderate pain. Advised to follow-up in clinic in 4-6 weeks for IUD string check if unable to find strings or as directed by provider.   -discussed in regards to skenes duct cyst , can have drained or follow up if obstructing urination , problems with intercourse or other concerns. Pt will let us know if any concerning sx.       Ritu Coe, DO

## 2022-09-09 LAB
BKR LAB AP GYN ADEQUACY: NORMAL
BKR LAB AP GYN INTERPRETATION: NORMAL
BKR LAB AP HPV REFLEX: NORMAL
BKR LAB AP PREVIOUS ABNORMAL: NORMAL
PATH REPORT.COMMENTS IMP SPEC: NORMAL
PATH REPORT.COMMENTS IMP SPEC: NORMAL
PATH REPORT.RELEVANT HX SPEC: NORMAL

## 2022-09-13 LAB
HUMAN PAPILLOMA VIRUS 16 DNA: NEGATIVE
HUMAN PAPILLOMA VIRUS 18 DNA: NEGATIVE
HUMAN PAPILLOMA VIRUS FINAL DIAGNOSIS: NORMAL
HUMAN PAPILLOMA VIRUS OTHER HR: NEGATIVE

## 2022-09-18 ENCOUNTER — HEALTH MAINTENANCE LETTER (OUTPATIENT)
Age: 37
End: 2022-09-18

## 2022-12-21 ENCOUNTER — IMMUNIZATION (OUTPATIENT)
Dept: FAMILY MEDICINE | Facility: CLINIC | Age: 37
End: 2022-12-21
Payer: COMMERCIAL

## 2022-12-21 PROCEDURE — 90686 IIV4 VACC NO PRSV 0.5 ML IM: CPT

## 2022-12-21 PROCEDURE — 90471 IMMUNIZATION ADMIN: CPT

## 2023-06-04 ENCOUNTER — HEALTH MAINTENANCE LETTER (OUTPATIENT)
Age: 38
End: 2023-06-04

## 2023-12-20 ASSESSMENT — ENCOUNTER SYMPTOMS
PARESTHESIAS: 0
COUGH: 0
SHORTNESS OF BREATH: 0
MYALGIAS: 0
HEMATOCHEZIA: 0
JOINT SWELLING: 0
HEMATURIA: 0
DIARRHEA: 0
FEVER: 0
WEAKNESS: 0
PALPITATIONS: 0
SORE THROAT: 0
HEARTBURN: 0
ARTHRALGIAS: 0
DYSURIA: 0
ABDOMINAL PAIN: 0
CONSTIPATION: 0
FREQUENCY: 0
DIZZINESS: 0
NAUSEA: 0
HEADACHES: 0
CHILLS: 0
NERVOUS/ANXIOUS: 0
EYE PAIN: 0
BREAST MASS: 0

## 2023-12-21 ENCOUNTER — MYC MEDICAL ADVICE (OUTPATIENT)
Dept: FAMILY MEDICINE | Facility: CLINIC | Age: 38
End: 2023-12-21

## 2023-12-21 ENCOUNTER — OFFICE VISIT (OUTPATIENT)
Dept: FAMILY MEDICINE | Facility: CLINIC | Age: 38
End: 2023-12-21
Payer: COMMERCIAL

## 2023-12-21 VITALS
WEIGHT: 209.38 LBS | SYSTOLIC BLOOD PRESSURE: 132 MMHG | BODY MASS INDEX: 31.73 KG/M2 | HEIGHT: 68 IN | TEMPERATURE: 97.9 F | HEART RATE: 66 BPM | RESPIRATION RATE: 16 BRPM | DIASTOLIC BLOOD PRESSURE: 71 MMHG | OXYGEN SATURATION: 100 %

## 2023-12-21 DIAGNOSIS — Z00.00 ANNUAL PHYSICAL EXAM: Primary | ICD-10-CM

## 2023-12-21 DIAGNOSIS — Z13.220 LIPID SCREENING: ICD-10-CM

## 2023-12-21 DIAGNOSIS — Z23 IMMUNIZATION DUE: ICD-10-CM

## 2023-12-21 DIAGNOSIS — Z12.31 ENCOUNTER FOR SCREENING MAMMOGRAM FOR BREAST CANCER: ICD-10-CM

## 2023-12-21 DIAGNOSIS — Z13.1 SCREENING FOR DIABETES MELLITUS: ICD-10-CM

## 2023-12-21 DIAGNOSIS — Z83.49 FAMILY HISTORY OF THYROID DISEASE: ICD-10-CM

## 2023-12-21 DIAGNOSIS — D22.30 CHANGE IN FACIAL MOLE: ICD-10-CM

## 2023-12-21 PROBLEM — Z97.5 IUD (INTRAUTERINE DEVICE) IN PLACE: Status: ACTIVE | Noted: 2023-12-21

## 2023-12-21 LAB — HBA1C MFR BLD: 6.9 % (ref 0–5.6)

## 2023-12-21 PROCEDURE — 86706 HEP B SURFACE ANTIBODY: CPT | Performed by: FAMILY MEDICINE

## 2023-12-21 PROCEDURE — 80061 LIPID PANEL: CPT | Performed by: FAMILY MEDICINE

## 2023-12-21 PROCEDURE — 90471 IMMUNIZATION ADMIN: CPT | Performed by: FAMILY MEDICINE

## 2023-12-21 PROCEDURE — 36415 COLL VENOUS BLD VENIPUNCTURE: CPT | Performed by: FAMILY MEDICINE

## 2023-12-21 PROCEDURE — 83036 HEMOGLOBIN GLYCOSYLATED A1C: CPT | Performed by: FAMILY MEDICINE

## 2023-12-21 PROCEDURE — 90686 IIV4 VACC NO PRSV 0.5 ML IM: CPT | Performed by: FAMILY MEDICINE

## 2023-12-21 PROCEDURE — 99396 PREV VISIT EST AGE 40-64: CPT | Mod: 25 | Performed by: FAMILY MEDICINE

## 2023-12-21 PROCEDURE — 84443 ASSAY THYROID STIM HORMONE: CPT | Performed by: FAMILY MEDICINE

## 2023-12-21 ASSESSMENT — ENCOUNTER SYMPTOMS
CHILLS: 0
NAUSEA: 0
MYALGIAS: 0
SHORTNESS OF BREATH: 0
NERVOUS/ANXIOUS: 0
HEMATURIA: 0
SORE THROAT: 0
PARESTHESIAS: 0
CONSTIPATION: 0
BREAST MASS: 0
HEADACHES: 0
COUGH: 0
ARTHRALGIAS: 0
DIARRHEA: 0
FREQUENCY: 0
WEAKNESS: 0
FEVER: 0
HEARTBURN: 0
DIZZINESS: 0
EYE PAIN: 0
ABDOMINAL PAIN: 0
DYSURIA: 0
PALPITATIONS: 0
HEMATOCHEZIA: 0
JOINT SWELLING: 0

## 2023-12-21 NOTE — PROGRESS NOTES
SUBJECTIVE:   Yue is a 38 year old, presenting for the following:  Physical    Chief Complaints and History of Present Illnesses   Patient presents with    Physical            12/21/2023     1:07 PM   Additional Questions   Roomed by Guerline RAMIREZ CMA   Accompanied by Self       Healthy Habits:     Getting at least 3 servings of Calcium per day:  Yes    Bi-annual eye exam:  Yes    Dental care twice a year:  Yes    Sleep apnea or symptoms of sleep apnea:  Excessive snoring    Diet:  Regular (no restrictions)    Frequency of exercise:  2-3 days/week    Duration of exercise:  15-30 minutes    Taking medications regularly:  Yes    Medication side effects:  None    Additional concerns today:  No      Today's PHQ-2 Score:       12/20/2023    10:07 AM   PHQ-2 ( 1999 Pfizer)   Q1: Little interest or pleasure in doing things 0   Q2: Feeling down, depressed or hopeless 0   PHQ-2 Score 0   Q1: Little interest or pleasure in doing things Not at all   Q2: Feeling down, depressed or hopeless Not at all   PHQ-2 Score 0     IUD in place: Mirena       PULMONARY NODULES:   Had surveillance CT, very tiny nodules, on problem list, last CXR 12/1/23 normal.       WEIGHT:   Body mass index is 32.31 kg/m .  Wt Readings from Last 4 Encounters:   12/21/23 95 kg (209 lb 6 oz)   09/06/22 91 kg (200 lb 9.6 oz)   03/29/21 91.8 kg (202 lb 6.4 oz)   11/05/19 94.3 kg (208 lb)       HEALTH MAINTENANCE:   - Flu: will do    - Covid: declines    - Hep B: thinks had vaccine    - Hep C: will check with insurance       Have you ever done Advance Care Planning? (For example, a Health Directive, POLST, or a discussion with a medical provider or your loved ones about your wishes): No, advance care planning information given to patient to review.  Advanced care planning was discussed at today's visit.    Social History     Tobacco Use    Smoking status: Never     Passive exposure: Never    Smokeless tobacco: Never   Substance Use Topics    Alcohol use: No              2023    10:07 AM   Alcohol Use   Prescreen: >3 drinks/day or >7 drinks/week? No     Reviewed orders with patient.  Reviewed health maintenance and updated orders accordingly - Yes      Breast Cancer Screenin/20/2023    10:08 AM   Breast CA Risk Assessment (FHS-7)   Do you have a family history of breast, colon, or ovarian cancer? No / Unknown         Mammogram Screening - Offered annual screening and updated Health Maintenance for mutual plan based on risk factor consideration  Pertinent mammograms are reviewed under the imaging tab.    History of abnormal Pap smear: NO - age 30-65 PAP every 5 years with negative HPV co-testing recommended      Latest Ref Rng & Units 2022     9:28 AM 2018     9:11 AM 2014    12:52 PM   PAP / HPV   PAP  Negative for Intraepithelial Lesion or Malignancy (NILM)  Negative for squamous intraepithelial lesion or malignancy  Electronically signed by Dayami Frausto CT (ASCP) on 2018 at 11:31 AM    Negative for squamous intraepithelial lesion or malignancy  Electronically signed by Dora Osuna CT (ASCP) on 7/10/2014 at  3:42 PM      HPV 16 DNA Negative Negative  Negative     HPV 18 DNA Negative Negative  Negative     Other HR HPV Negative Negative  Negative       Reviewed and updated as needed this visit by clinical staff   Tobacco  Allergies  Meds              Reviewed and updated as needed this visit by Provider   Tobacco  Allergies  Meds  Problems  Med Hx  Surg Hx  Fam Hx             Review of Systems   Constitutional:  Negative for chills and fever.   HENT:  Negative for congestion, ear pain, hearing loss and sore throat.    Eyes:  Negative for pain and visual disturbance.   Respiratory:  Negative for cough and shortness of breath.    Cardiovascular:  Negative for chest pain, palpitations and peripheral edema.   Gastrointestinal:  Negative for abdominal pain, constipation, diarrhea, heartburn, hematochezia and  "nausea.   Breasts:  Negative for tenderness, breast mass and discharge.   Genitourinary:  Negative for dysuria, frequency, genital sores, hematuria, pelvic pain, urgency, vaginal bleeding and vaginal discharge.   Musculoskeletal:  Negative for arthralgias, joint swelling and myalgias.   Skin:  Negative for rash.   Neurological:  Negative for dizziness, weakness, headaches and paresthesias.   Psychiatric/Behavioral:  Negative for mood changes. The patient is not nervous/anxious.         OBJECTIVE:   /71 (BP Location: Left arm, Patient Position: Sitting, Cuff Size: Adult Regular)   Pulse 66   Temp 97.9  F (36.6  C) (Oral)   Resp 16   Ht 1.715 m (5' 7.5\")   Wt 95 kg (209 lb 6 oz)   LMP  (LMP Unknown)   SpO2 100%   BMI 32.31 kg/m    Physical Exam  GENERAL: healthy, alert and no distress  EYES: Eyes grossly normal to inspection, PERRL and conjunctivae and sclerae normal  HENT: ear canals and TM's normal, nose and mouth without ulcers or lesions  NECK: no adenopathy, no asymmetry, masses, or scars and thyroid normal to palpation  RESP: lungs clear to auscultation - no rales, rhonchi or wheezes  BREAST: normal without masses, tenderness or nipple discharge and no palpable axillary masses or adenopathy  CV: regular rate and rhythm, normal S1 S2, no S3 or S4, no murmur, click or rub, no peripheral edema and peripheral pulses strong  ABDOMEN: soft, nontender, no hepatosplenomegaly, no masses and bowel sounds normal  MS: no gross musculoskeletal defects noted, no edema  SKIN: no suspicious lesions or rashes. Flesh colored nevus to left of mouth.  NEURO: Normal strength and tone, mentation intact and speech normal  PSYCH: mentation appears normal, affect normal/bright        ASSESSMENT/PLAN:     1. Annual physical exam  Reviewed health history and health maintenance recommendations.    2. Change in facial mole  - Adult Dermatology  Referral; Future    Concerned lesion is slowly getting bigger.  Due to " "cosmetically sensitive area, recommend evaluation with dermatology.    3. Screening for diabetes mellitus  - Hemoglobin A1c    4. Family history of thyroid disease  - TSH with free T4 reflex    5. Lipid screening  - Lipid panel reflex to direct LDL Fasting    6. Encounter for screening mammogram for breast cancer  - MA Screen Bilateral w/Eddie; Future    7. Immunization due  - INFLUENZA VACCINE IM > 6 MONTHS VALENT IIV4 (AFLURIA/FLUZONE)  - Hepatitis B Surface Antibody      Think she may have completed hep B vaccination in the past, will screen for immunity.    Patient has been advised of split billing requirements and indicates understanding: Yes      COUNSELING:  Reviewed preventive health counseling, as reflected in patient instructions      BMI:   Estimated body mass index is 32.31 kg/m  as calculated from the following:    Height as of this encounter: 1.715 m (5' 7.5\").    Weight as of this encounter: 95 kg (209 lb 6 oz).   Weight management plan: Discussed healthy diet and exercise guidelines      She reports that she has never smoked. She has never been exposed to tobacco smoke. She has never used smokeless tobacco.          Merle Alegria, DO  [facility]  "

## 2023-12-22 LAB
CHOLEST SERPL-MCNC: 143 MG/DL
FASTING STATUS PATIENT QL REPORTED: NO
HBV SURFACE AB SERPL IA-ACNC: 79.1 M[IU]/ML
HBV SURFACE AB SERPL IA-ACNC: REACTIVE M[IU]/ML
HDLC SERPL-MCNC: 54 MG/DL
LDLC SERPL CALC-MCNC: 63 MG/DL
NONHDLC SERPL-MCNC: 89 MG/DL
TRIGL SERPL-MCNC: 130 MG/DL
TSH SERPL DL<=0.005 MIU/L-ACNC: 1.02 UIU/ML (ref 0.3–4.2)

## 2023-12-23 NOTE — RESULT ENCOUNTER NOTE
Jennifer Team - Please schedule Yue a follow up virtual visit to discuss her elevated A1c. Thank you.      Chalino Turner,  Your lab results have returned.  1. Your are immune to hepatitis B.  2. Your A1c has returned elevated into the diabetes range. A1c at 6.5 or higher is consistent with diabetes. Your A1c is at goal below 7.0. I recommend we discuss these results, treatment options, and monitoring plan further in a follow up virtual visit. I will ask my staff to reach out to help coordinate.  3. Remaining labs look good.  Merle Alegria, DO

## 2023-12-29 ENCOUNTER — MYC MEDICAL ADVICE (OUTPATIENT)
Dept: FAMILY MEDICINE | Facility: CLINIC | Age: 38
End: 2023-12-29
Payer: COMMERCIAL

## 2024-01-05 ENCOUNTER — ANCILLARY PROCEDURE (OUTPATIENT)
Dept: MAMMOGRAPHY | Facility: CLINIC | Age: 39
End: 2024-01-05
Attending: FAMILY MEDICINE
Payer: COMMERCIAL

## 2024-01-05 DIAGNOSIS — Z12.31 ENCOUNTER FOR SCREENING MAMMOGRAM FOR BREAST CANCER: ICD-10-CM

## 2024-01-05 PROCEDURE — 77063 BREAST TOMOSYNTHESIS BI: CPT

## 2024-01-10 ENCOUNTER — VIRTUAL VISIT (OUTPATIENT)
Dept: FAMILY MEDICINE | Facility: CLINIC | Age: 39
End: 2024-01-10
Payer: COMMERCIAL

## 2024-01-10 ENCOUNTER — MYC MEDICAL ADVICE (OUTPATIENT)
Dept: FAMILY MEDICINE | Facility: CLINIC | Age: 39
End: 2024-01-10

## 2024-01-10 DIAGNOSIS — R06.83 SNORING: Primary | ICD-10-CM

## 2024-01-10 DIAGNOSIS — E11.9 TYPE 2 DIABETES MELLITUS WITHOUT COMPLICATION, WITH LONG-TERM CURRENT USE OF INSULIN (H): Primary | ICD-10-CM

## 2024-01-10 DIAGNOSIS — Z79.4 TYPE 2 DIABETES MELLITUS WITHOUT COMPLICATION, WITH LONG-TERM CURRENT USE OF INSULIN (H): Primary | ICD-10-CM

## 2024-01-10 PROCEDURE — 99213 OFFICE O/P EST LOW 20 MIN: CPT | Mod: 95 | Performed by: FAMILY MEDICINE

## 2024-01-10 NOTE — TELEPHONE ENCOUNTER
1. Snoring  - Adult Sleep Eval & Management  Referral; Future     Evaluate for ANGELY.    Merle Alegria, DO

## 2024-01-10 NOTE — PROGRESS NOTES
Yue is a 38 year old who is being evaluated via a billable video visit.      How would you like to obtain your AVS? MyChart  If the video visit is dropped, the invitation should be resent by: Text to cell phone: [phone]  Will anyone else be joining your video visit? No          Assessment & Plan     1. Type 2 diabetes mellitus without complication, with long-term current use of insulin (H)  - Adult Diabetes Education  Referral; Future  - Hemoglobin A1c; Future      Reviewed recent A1c results.  A1c is in the diabetes range at 6.9.  Opting for lifestyle modification at this time, close follow-up with repeat A1c in 3 months.  Reviewed metformin as first-line treatment.  Reviewed her cholesterol panel, LDL under 70, do not recommend initiation of statin at this time as she is low risk.  Recommend seeing her eye doctor for diabetic eye exam, she will schedule.  At next visit, we will complete foot exam.    Reviewed healthy lifestyle modifications.  She is interested in meeting with diabetic education for more guidance on nutrition.  Referral placed.    22 minutes spent on date of encounter with chart review, patient visit, counseling, and documentation.    [facility]le Alegria [facility]    Subjective   Yue is a 38 year old, presenting for the following health issues:  Results (Discuss A1c)      1/10/2024     9:01 AM   Additional Questions   Roomed by Guerline RAMIREZ CMA   Accompanied by Self       History of Present Illness       Diabetes:   She presents for follow up of diabetes.    She is not checking blood glucose.        She is concerned about other.    She is not experiencing numbness or burning in feet, excessive thirst, blurry vision, weight changes or redness, sores or blisters on feet.           She eats 2-3 servings of fruits and vegetables daily.She consumes 1 sweetened beverage(s) daily.She exercises with enough effort to increase her heart rate 10 to 19 minutes per  day.  She exercises with enough effort to increase her heart rate 3 or less days per week.   She is taking medications regularly.     Lab Results   Component Value Date    A1C 6.9 12/21/2023    A1C 5.6 07/28/2016     Lab Results   Component Value Date    CHOL 143 12/21/2023     Lab Results   Component Value Date    HDL 54 12/21/2023     Lab Results   Component Value Date    LDL 63 12/21/2023     Lab Results   Component Value Date    TRIG 130 12/21/2023     The 10-year ASCVD risk score (Ivan LEON, et al., 2019) is: 0.6%    Values used to calculate the score:      Age: 38 years      Sex: Female      Is Non- : No      Diabetic: Yes      Tobacco smoker: No      Systolic Blood Pressure: 132 mmHg      Is BP treated: No      HDL Cholesterol: 54 mg/dL      Total Cholesterol: 143 mg/dL     BMI 32    Next scheduled to see eye doctor soon. Doing Lasik consultation.       Review of Systems   See HPI above.       Objective    Vitals - Patient Reported  Weight (Patient Reported): 94.8 kg (209 lb)        Physical Exam   GENERAL: Healthy, alert and no distress  EYES: Eyes grossly normal to inspection.  No discharge or erythema, or obvious scleral/conjunctival abnormalities.  RESP: No audible wheeze, cough, or visible cyanosis.  No visible retractions or increased work of breathing.    SKIN: Visible skin clear. No significant rash, abnormal pigmentation or lesions.  NEURO: Cranial nerves grossly intact.  Mentation and speech appropriate for age.  PSYCH: Mentation appears normal, affect normal/bright, judgement and insight intact, normal speech and appearance well-groomed.            Video-Visit Details    Type of service:  Video Visit     Originating Location (pt. Location): Home    Distant Location (provider location):  On-site  Platform used for Video Visit: [employer]

## 2024-01-12 ENCOUNTER — ANCILLARY PROCEDURE (OUTPATIENT)
Dept: MAMMOGRAPHY | Facility: CLINIC | Age: 39
End: 2024-01-12
Attending: FAMILY MEDICINE
Payer: COMMERCIAL

## 2024-01-12 DIAGNOSIS — N64.89 BREAST ASYMMETRY: ICD-10-CM

## 2024-01-12 DIAGNOSIS — R92.8 ABNORMAL MAMMOGRAM: Primary | ICD-10-CM

## 2024-01-12 PROCEDURE — 77065 DX MAMMO INCL CAD UNI: CPT | Mod: LT

## 2024-01-12 PROCEDURE — 76642 ULTRASOUND BREAST LIMITED: CPT | Mod: LT

## 2024-01-12 NOTE — PROGRESS NOTES
1. Abnormal mammogram  - MA Diagnostic Left w/ Eddie; Future  - US Breast Left Limited 1-3 Quadrants; Future     Future orders placed for 6 month follow up as recommended.    Merle Alegria, DO

## 2024-01-12 NOTE — RESULT ENCOUNTER NOTE
Patient updated by [employer] message.      Chalino Turner,  I did place future orders for the 6-month follow-up as recommended.  Merle Alegria, DO

## 2024-01-30 PROBLEM — Z79.4 TYPE 2 DIABETES MELLITUS WITHOUT COMPLICATION, WITH LONG-TERM CURRENT USE OF INSULIN (H): Chronic | Status: ACTIVE | Noted: 2024-01-10

## 2024-01-30 PROBLEM — E11.9 TYPE 2 DIABETES MELLITUS WITHOUT COMPLICATION, WITH LONG-TERM CURRENT USE OF INSULIN (H): Chronic | Status: ACTIVE | Noted: 2024-01-10

## 2024-01-30 PROBLEM — E66.09 CLASS 1 OBESITY DUE TO EXCESS CALORIES WITHOUT SERIOUS COMORBIDITY WITH BODY MASS INDEX (BMI) OF 32.0 TO 32.9 IN ADULT: Chronic | Status: ACTIVE | Noted: 2018-12-17

## 2024-01-30 PROBLEM — E66.811 CLASS 1 OBESITY DUE TO EXCESS CALORIES WITHOUT SERIOUS COMORBIDITY WITH BODY MASS INDEX (BMI) OF 32.0 TO 32.9 IN ADULT: Chronic | Status: ACTIVE | Noted: 2018-12-17

## 2024-01-31 ENCOUNTER — VIRTUAL VISIT (OUTPATIENT)
Dept: SLEEP MEDICINE | Facility: CLINIC | Age: 39
End: 2024-01-31
Attending: FAMILY MEDICINE
Payer: COMMERCIAL

## 2024-01-31 VITALS — BODY MASS INDEX: 31.07 KG/M2 | WEIGHT: 205 LBS | HEIGHT: 68 IN

## 2024-01-31 DIAGNOSIS — G47.61 PERIODIC LIMB MOVEMENT DISORDER: ICD-10-CM

## 2024-01-31 DIAGNOSIS — R53.81 MALAISE AND FATIGUE: ICD-10-CM

## 2024-01-31 DIAGNOSIS — R06.83 SNORING: ICD-10-CM

## 2024-01-31 DIAGNOSIS — R06.00 DYSPNEA AND RESPIRATORY ABNORMALITY: Primary | ICD-10-CM

## 2024-01-31 DIAGNOSIS — R53.83 MALAISE AND FATIGUE: ICD-10-CM

## 2024-01-31 DIAGNOSIS — R06.89 DYSPNEA AND RESPIRATORY ABNORMALITY: Primary | ICD-10-CM

## 2024-01-31 DIAGNOSIS — Z72.820 LACK OF ADEQUATE SLEEP: ICD-10-CM

## 2024-01-31 DIAGNOSIS — E66.09 CLASS 1 OBESITY DUE TO EXCESS CALORIES WITH SERIOUS COMORBIDITY AND BODY MASS INDEX (BMI) OF 31.0 TO 31.9 IN ADULT: ICD-10-CM

## 2024-01-31 DIAGNOSIS — E66.811 CLASS 1 OBESITY DUE TO EXCESS CALORIES WITH SERIOUS COMORBIDITY AND BODY MASS INDEX (BMI) OF 31.0 TO 31.9 IN ADULT: ICD-10-CM

## 2024-01-31 PROCEDURE — 99204 OFFICE O/P NEW MOD 45 MIN: CPT | Mod: 95 | Performed by: PHYSICIAN ASSISTANT

## 2024-01-31 RX ORDER — ZOLPIDEM TARTRATE 5 MG/1
TABLET ORAL
Qty: 1 TABLET | Refills: 0 | Status: SHIPPED | OUTPATIENT
Start: 2024-01-31

## 2024-01-31 ASSESSMENT — SLEEP AND FATIGUE QUESTIONNAIRES
HOW LIKELY ARE YOU TO NOD OFF OR FALL ASLEEP WHEN YOU ARE A PASSENGER IN A CAR FOR AN HOUR WITHOUT A BREAK: SLIGHT CHANCE OF DOZING
HOW LIKELY ARE YOU TO NOD OFF OR FALL ASLEEP WHILE SITTING QUIETLY AFTER LUNCH WITHOUT ALCOHOL: WOULD NEVER DOZE
HOW LIKELY ARE YOU TO NOD OFF OR FALL ASLEEP WHILE LYING DOWN TO REST IN THE AFTERNOON WHEN CIRCUMSTANCES PERMIT: MODERATE CHANCE OF DOZING
HOW LIKELY ARE YOU TO NOD OFF OR FALL ASLEEP WHILE WATCHING TV: SLIGHT CHANCE OF DOZING
HOW LIKELY ARE YOU TO NOD OFF OR FALL ASLEEP WHILE SITTING INACTIVE IN A PUBLIC PLACE: WOULD NEVER DOZE
HOW LIKELY ARE YOU TO NOD OFF OR FALL ASLEEP WHILE SITTING AND READING: SLIGHT CHANCE OF DOZING
HOW LIKELY ARE YOU TO NOD OFF OR FALL ASLEEP WHILE SITTING AND TALKING TO SOMEONE: WOULD NEVER DOZE
HOW LIKELY ARE YOU TO NOD OFF OR FALL ASLEEP IN A CAR, WHILE STOPPED FOR A FEW MINUTES IN TRAFFIC: WOULD NEVER DOZE

## 2024-01-31 ASSESSMENT — PAIN SCALES - GENERAL: PAINLEVEL: NO PAIN (0)

## 2024-01-31 NOTE — PATIENT INSTRUCTIONS
"          MY TREATMENT INFORMATION FOR SLEEP APNEA-  Yue Rivera    DOCTOR : DEMETRIO Nina    Am I having a sleep study at a sleep center?  --->Due to normal delays, you will be contacted within 2-4 weeks to schedule    Am I having a home sleep study?  --->Watch the video for the device you are using:    -/drop off device-   https://www.VOYAAube.com/watch?v=yGGFBdELGhk    -Disposable device sent out require phone/computer application-   https://www.Apliiq.com/watch?v=BCce_vbiwxE      Frequently asked questions:  1. What is Obstructive Sleep Apnea (ANGELY)? ANGELY is the most common type of sleep apnea. Apnea means, \"without breath.\"  Apnea is most often caused by narrowing or collapse of the upper airway as muscles relax during sleep.   Almost everyone has occasional apneas. Most people with sleep apnea have had brief interruptions at night frequently for many years.  The severity of sleep apnea is related to how frequent and severe the events are.   2. What are the consequences of ANGELY? Symptoms include: feeling sleepy during the day, snoring loudly, gasping or stopping of breathing, trouble sleeping, and occasionally morning headaches or heartburn at night.  Sleepiness can be serious and even increase the risk of falling asleep while driving. Other health consequences may include development of high blood pressure and other cardiovascular disease in persons who are susceptible. Untreated ANGELY  can contribute to heart disease, stroke and diabetes.   3. What are the treatment options? In most situations, sleep apnea is a lifelong disease that must be managed with daily therapy. Medications are not effective for sleep apnea and surgery is generally not considered until other therapies have been tried. Your treatment is your choice . Continuous Positive Airway (CPAP) works right away and is the therapy that is effective in nearly everyone. An oral device to hold your jaw forward is usually the next most " reliable option. Other options include postioning devices (to keep you off your back), weight loss, and surgery including a tongue pacing device. There is more detail about some of these options below.  4. Are my sleep studies covered by insurance? Although we will request verification of coverage, we advise you also check in advance of the study to ensure there is coverage.    Important tips for those choosing CPAP and similar devices  For new devices, sign up for device ROGERIO to monitor your device for your followup visits  We encourage you to utilize the [employer] rogerio or website (myAir web (resmed.com) ) to monitor your therapy progress and share the data with your healthcare team when you discuss your sleep apnea.                                                    Know your equipment:  CPAP is continuous positive airway pressure that prevents obstructive sleep apnea by keeping the throat from collapsing while you are sleeping. In most cases, the device is  smart  and can slowly self-adjusts if your throat collapses and keeps a record every day of how well you are treated-this information is available to you and your care team.  BPAP is bilevel positive airway pressure that keeps your throat open and also assists each breath with a pressure boost to maintain adequate breathing.  Special kinds of BPAP are used in patients who have inadequate breathing from lung or heart disease. In most cases, the device is  smart  and can slowly self-adjusts to assist breathing. Like CPAP, the device keeps a record of how well you are treated.  Your mask is your connection to the device. You get to choose what feels most comfortable and the staff will help to make sure if fits. Here: are some examples of the different masks that are available: Magnetic mask aids may assist with use but there are safety issues that should be addressed when considering with magnets* ( see end of discussion).       Key points to remember on your  journey with sleep apnea:  Sleep study.  PAP devices often need to be adjusted during a sleep study to show that they are effective and adjusted right.  Good tips to remember: Try wearing just the mask during a quiet time during the day so your body adapts to wearing it. A humidifier is recommended for comfort in most cases to prevent drying of your nose and throat. Allergy medication from your provider may help you if you are having nasal congestion.  Getting settled-in. It takes more than one night for most of us to get used to wearing a mask. Try wearing just the mask during a quiet time during the day so your body adapts to wearing it. A humidifier is recommended for comfort in most cases. Our team will work with you carefully on the first day and will be in contact within 4 days and again at 2 and 4 weeks for advice and remote device adjustments. Your therapy is evaluated by the device each day.   Use it every night. The more you are able to sleep naturally for 7-8 hours, the more likely you will have good sleep and to prevent health risks or symptoms from sleep apnea. Even if you use it 4 hours it helps. Occasionally all of us are unable to use a medical therapy, in sleep apnea, it is not dangerous to miss one night.   Communicate. Call our skilled team on the number provided on the first day if your visit for problems that make it difficult to wear the device. Over 2 out of 3 patients can learn to wear the device long-term with help from our team. Remember to call our team or your sleep providers if you are unable to wear the device as we may have other solutions for those who cannot adapt to mask CPAP therapy. It is recommended that you sleep your sleep provider within the first 3 months and yearly after that if you are not having problems.   Use it for your health. We encourage use of CPAP masks during daytime quiet periods to allow your face and brain to adapt to the sensation of CPAP so that it will be a  more natural sensation to awaken to at night or during naps. This can be very useful during the first few weeks or months of adapting to CPAP though it does not help medically to wear CPAP during wakefulness and  should not be used as a strategy just to meet guidelines.  Take care of your equipment. Make sure you clean your mask and tubing using directions every day and that your filter and mask are replaced as recommended or if they are not working.     *Masks with magnets:  Updated Contraindications  Masks with magnetic components are contraindicated for use by patients where they, or anyone in close physical contact while using the mask, have the following:   Active medical implants that interact with magnets (i.e., pacemakers, implantable cardioverter defibrillators (ICD), neurostimulators, cerebrospinal fluid (CSF) shunts, insulin/infusion pumps)   Metallic implants/objects containing ferromagnetic material (i.e., aneurysm clips/flow disruption devices, embolic coils, stents, valves, electrodes, implants to restore hearing or balance with implanted magnets, ocular implants, metallic splinters in the eye)  Updated Warning  Keep the mask magnets at a safe distance of at least 6 inches (150 mm) away from implants or medical devices that may be adversely affected by magnetic interference. This warning applies to you or anyone in close physical contact with your mask. The magnets are in the frame and lower headgear clips, with a magnetic field strength of up to 400mT. When worn, they connect to secure the mask but may inadvertently detach while asleep.  Implants/medical devices, including those listed within contraindications, may be adversely affected if they change function under external magnetic fields or contain ferromagnetic materials that attract/repel to magnetic fields (some metallic implants, e.g., contact lenses with metal, dental implants, metallic cranial plates, screws, raúl hole covers, and bone  substitute devices). Consult your physician and  of your implant / other medical device for information on the potential adverse effects of magnetic fields.    BESIDES CPAP, WHAT OTHER THERAPIES ARE THERE?    Positioning Device  Positioning devices are generally used when sleep apnea is mild and only occurs on your back.This example shows a pillow that straps around the waist. It may be appropriate for those whose sleep study shows milder sleep apnea that occurs primarily when lying flat on one's back. Preliminary studies have shown benefit but effectiveness at home may need to be verified by a home sleep test. These devices are generally not covered by medical insurance.  Examples of devices that maintain sleeping on the back to prevent snoring and mild sleep apnea.    Belt type body positioner  http://Pushkart/    Electronic reminder  http://nightshifttherapy.com/            Oral Appliance  What is oral appliance therapy?  An oral appliance device fits on your teeth at night like a retainer used after having braces. The device is made by a specialized dentist and requires several visits over 1-2 months before a manufactured device is made to fit your teeth and is adjusted to prevent your sleep apnea. Once an oral device is working properly, snoring should be improved. A home sleep test may be recommended at that time if to determine whether the sleep apnea is adequately treated.       Some things to remember:  -Oral devices are often, but not always, covered by your medical insurance. Be sure to check with your insurance provider.   -If you are referred for oral therapy, you will be given a list of specialized dentists to consider or you may choose to visit the Web site of the American Academy of Dental Sleep Medicine  -Oral devices are less likely to work if you have severe sleep apnea or are extremely overweight.     More detailed information  An oral appliance is a small acrylic device that fits  over the upper and lower teeth  (similar to a retainer or a mouth guard). This device slightly moves jaw forward, which moves the base of the tongue forward, opens the airway, improves breathing for effective treat snoring and obstructive sleep apnea in perhaps 7 out of 10 people .  The best working devices are custom-made by a dental device  after a mold is made of the teeth 1, 2, 3.  When is an oral appliance indicated?  Oral appliance therapy is recommended as a first-line treatment for patients with primary snoring, mild sleep apnea, and for patients with moderate sleep apnea who prefer appliance therapy to use of CPAP4, 5. Severity of sleep apnea is determined by sleep testing and is based on the number of respiratory events per hour of sleep.   How successful is oral appliance therapy?  The success rate of oral appliance therapy in patients with mild sleep apnea is 75-80% while in patients with moderate sleep apnea it is 50-70%. The chance of success in patients with severe sleep apnea is 40-50%. The research also shows that oral appliances have a beneficial effect on the cardiovascular health of ANGELY patients at the same magnitude as CPAP therapy7.  Oral appliances should be a second-line treatment in cases of severe sleep apnea, but if not completely successful then a combination therapy utilizing CPAP plus oral appliance therapy may be effective. Oral appliances tend to be effective in a broad range of patients although studies show that the patients who have the highest success are females, younger patients, those with milder disease, and less severe obesity. 3, 6.   Finding a dentist that practices dental sleep medicine  Specific training is available through the American Academy of Dental Sleep Medicine for dentists interested in working in the field of sleep. To find a dentist who is educated in the field of sleep and the use of oral appliances, near you, visit the Web site of the American  Academy of Dental Sleep Medicine.    References  1. Evelyne et al. Objectively measured vs self-reported compliance during oral appliance therapy for sleep-disordered breathing. Chest 2013; 144(5): 3764-3352.  2. Max et al. Objective measurement of compliance during oral appliance therapy for sleep-disordered breathing. Thorax 2013; 68(1): 91-96.  3. Antwan, et al. Mandibular advancement devices in 620 men and women with ANGELY and snoring: tolerability and predictors of treatment success. Chest 2004; 125: 1742-6801.  4. Katya et al. Oral appliances for snoring and ANGELY: a review. Sleep 2006; 29: 244-262.  5. Easton et al. Oral appliance treatment for ANGELY: an update. J Clin Sleep Med 2014; 10(2): 215-227.  6. Jam et al. Predictors of OSAH treatment outcome. J Dent Res 2007; 86: 1222-1463.      Weight Loss:    Weight loss is a long-term strategy that may improve sleep apnea in some patients.    Weight management is a personal decision and the decision should be based on your interest and the potential benefits.  If you are interested in exploring weight loss strategies, the following discussion covers the impact on weight loss on sleep apnea and the approaches that may be successful.    Being overweight does not necessarily mean you will have health consequences.  Those who have BMI over 35 or over 27 with existing medical conditions carries greater risk.   Weight loss decreases severity of sleep apnea in most people with obesity. For those with mild obesity who have developed snoring with weight gain, even 15-30 pound weight loss can improve and occasionally eliminate sleep apnea.  Structured and life-long dietary and health habits are necessary to lose weight and keep healthier weight levels.     Though there may be significant health benefits from weight loss, long-term weight loss is very difficult to achieve- studies show success with dietary management in less than 10% of people. In  addition, substantial weight loss may require years of dietary control and may be difficult if patients have severe obesity. In these cases, surgical management may be considered.  Finally, older individuals who have tolerated obesity without health complications may be less likely to benefit from weight loss strategies.      Body mass index is 31.17 kg/m .    Surgery:    Surgery for obstructive sleep apnea is considered generally only when other therapies fail to work. Surgery may be discussed with you if you are having a difficult time tolerating CPAP and or when there is an abnormal structure that requires surgical correction.  Nose and throat surgeries often enlarge the airway to prevent collapse.  Most of these surgeries create pain for 1-2 weeks and up to half of the most common surgeries are not effective throughout life.  You should carefully discuss the benefits and drawbacks to surgery with your sleep provider and surgeon to determine if it is the best solution for you.   More information  Surgery for ANGELY is directed at areas that are responsible for narrowing or complete obstruction of the airway during sleep.  There are a wide range of procedures available to enlarge and/or stabilize the airway to prevent blockage of breathing in the three major areas where it can occur: the palate, tongue, and nasal regions.  Successful surgical treatment depends on the accurate identification of the factors responsible for obstructive sleep apnea in each person.  A personalized approach is required because there is no single treatment that works well for everyone.  Because of anatomic variation, consultation with an examination by a sleep surgeon is a critical first step in determining what surgical options are best for each patient.  In some cases, examination during sedation may be recommended in order to guide the selection of procedures.  Patients will be counseled about risks and benefits as well as the typical  recovery course after surgery. Surgery is typically not a cure for a person s ANGELY.  However, surgery will often significantly improve one s ANGELY severity (termed  success rate ).  Even in the absence of a cure, surgery will decrease the cardiovascular risk associated with OSA7; improve overall quality of life8 (sleepiness, functionality, sleep quality, etc).      Palate Procedures:  Patients with ANGELY often have narrowing of their airway in the region of their tonsils and uvula.  The goals of palate procedures are to widen the airway in this region as well as to help the tissues resist collapse.  Modern palate procedure techniques focus on tissue conservation and soft tissue rearrangement, rather than tissue removal.  Often the uvula is preserved in this procedure. Residual sleep apnea is common in patient after pharyngoplasty with an average reduction in sleep apnea events of 33%2.      Tongue Procedures:  ExamWhile patients are awake, the muscles that surround the throat are active and keep this region open for breathing. These muscles relax during sleep, allowing the tongue and other structures to collapse and block breathing.  There are several different tongue procedures available.  Selection of a tongue base procedure depends on characteristics seen on physical exam.  Generally, procedures are aimed at removing bulky tissues in this area or preventing the back of the tongue from falling back during sleep.  Success rates for tongue surgery range from 50-62%3.    Hypoglossal Nerve Stimulation:  Hypoglossal nerve stimulation has recently received approval from the [place] Food and Drug Administration for the treatment of obstructive sleep apnea.  This is based on research showing that the system was safe and effective in treating sleep apnea6.  Results showed that the median AHI score decreased 68%, from 29.3 to 9.0. This therapy uses an implant system that senses breathing patterns and delivers mild  stimulation to airway muscles, which keeps the airway open during sleep.  The system consists of three fully implanted components: a small generator (similar in size to a pacemaker), a breathing sensor, and a stimulation lead.  Using a small handheld remote, a patient turns the therapy on before bed and off upon awakening.    Candidates for this device must be greater than 18 years of age, have moderate to severe obstructive sleep apnea with less than 25% central events  (AHI between 15-65), BMI less than 35, have tried CPAP/oral appliance for at least 8 weeks without success, and have appropriate upper airway anatomy (determined by a sleep endoscopy performed by Dr. Richie Forde or Dr. Ronen Field).    Nasal Procedures:  Nasal obstruction can interfere with nasal breathing during the day and night.  Studies have shown that relief of nasal obstruction can improve the ability of some patients to tolerate positive airway pressure therapy for obstructive sleep apnea1.  Treatment options include medications such as nasal saline, topical corticosteroid and antihistamine sprays, and oral medications such as antihistamines or decongestants. Non-surgical treatments can include external nasal dilators for selected patients. If these are not successful by themselves, surgery can improve the nasal airway either alone or in combination with these other options.        Combination Procedures:  Combination of surgical procedures and other treatments may be recommended, particularly if patients have more than one area of narrowing or persistent positional disease.  The success rate of combination surgery ranges from 66-80%2,3.    References  Bartolome WEINBERG. The Role of the Nose in Snoring and Obstructive Sleep Apnoea: An Update.  Eur Arch Otorhinolaryngol. 2011; 268: 1365-73.   Pedro SM; Odin JA; Malissa JR; Pallanch JF; Andres GIBSON; Georgie NEWELL; Angela HILARIO. Surgical modifications of the upper airway for obstructive sleep apnea in  adults: a systematic review and meta-analysis. SLEEP 2010;33(10):8633-2747. Shaq SULLIVAN. Hypopharyngeal surgery in obstructive sleep apnea: an evidence-based medicine review.  Arch Otolaryngol Head Neck Surg. 2006 Feb;132(2):206-13.  Jez YH1, Sonam Y, Jesus Manuel BAIRON. The efficacy of anatomically based multilevel surgery for obstructive sleep apnea. Otolaryngol Head Neck Surg. 2003 Oct;129(4):327-35.  Kezirian E, Goldberg A. Hypopharyngeal Surgery in Obstructive Sleep Apnea: An Evidence-Based Medicine Review. Arch Otolaryngol Head Neck Surg. 2006 Feb;132(2):206-13.  Jim MARTIN et al. Upper-Airway Stimulation for Obstructive Sleep Apnea.  N Engl J Med. 2014 Jan 9;370(2):139-49.  Sunni Y et al. Increased Incidence of Cardiovascular Disease in Middle-aged Men with Obstructive Sleep Apnea. Am J Respir Crit Care Med; 2002 166: 159-165  Dupree EM et al. Studying Life Effects and Effectiveness of Palatopharyngoplasty (SLEEP) study: Subjective Outcomes of Isolated Uvulopalatopharyngoplasty. Otolaryngol Head Neck Surg. 2011; 144: 623-631.        WHAT IF I ONLY HAVE SNORING?    Mandibular advancement devices, lateral sleep positioning, long-term weight loss and treatment of nasal allergies have been shown to improve snoring.  Exercising tongue muscles with a game (https://apps.MTPV/us/rogerio/Fieldglass-reduce-snoring/do5635051254) or stimulating the tongue during the day with a device (https://doi.org/10.3390/stb72257445) have improved snoring in some individuals.  https://www.Avtozaper.com/  https://www.sleepfoundation.org/best-anti-snoring-mouthpieces-and-mouthguards    Remember to Drive Safe... Drive Alive     Sleep health profoundly affects your health, mood, and your safety.  Thirty three percent of the population (one in three of us) is not getting enough sleep and many have a sleep disorder. Not getting enough sleep or having an untreated / undertreated sleep condition may make us sleepy without even knowing it. In fact, our  driving could be dramatically impaired due to our sleep health. As your provider, here are some things I would like you to know about driving:     Here are some warning signs for impairment and dangerous drowsy driving:              -Having been awake more than 16 hours               -Looking tired               -Eyelid drooping              -Head nodding (it could be too late at this point)              -Driving for more than 30 minutes     Some things you could do to make the driving safer if you are experiencing some drowsiness:              -Stop driving and rest              -Call for transportation              -Make sure your sleep disorder is adequately treated     Some things that have been shown NOT to work when experiencing drowsiness while driving:              -Turning on the radio              -Opening windows              -Eating any  distracting  /  entertaining  foods (e.g., sunflower seeds, candy, or any other)              -Talking on the phone      Your decision may not only impact your life, but also the life of others. Please, remember to drive safe for yourself and all of us.           Your Body mass index is 31.17 kg/m .  Weight management is a personal decision.  If you are interested in exploring weight loss strategies, the following discussion covers the approaches that may be successful. Body mass index (BMI) is one way to tell whether you are at a healthy weight, overweight, or obese. It measures your weight in relation to your height.  A BMI of 18.5 to 24.9 is in the healthy range. A person with a BMI of 25 to 29.9 is considered overweight, and someone with a BMI of 30 or greater is considered obese. More than two-thirds of American adults are considered overweight or obese.  Being overweight or obese increases the risk for further weight gain. Excess weight may lead to heart disease and diabetes.  Creating and following plans for healthy eating and physical activity may help you  improve your health.  Weight control is part of healthy lifestyle and includes exercise, emotional health, and healthy eating habits. Careful eating habits lifelong are the mainstay of weight control. Though there are significant health benefits from weight loss, long-term weight loss with diet alone may be very difficult to achieve- studies show long-term success with dietary management in less than 10% of people. Attaining a healthy weight may be especially difficult to achieve in those with severe obesity. In some cases, medications, devices and surgical management might be considered.  What can you do?  If you are overweight or obese and are interested in methods for weight loss, you should discuss this with your provider.   Consider reducing daily calorie intake by 500 calories.   Keep a food journal.   Avoiding skipping meals, consider cutting portions instead.    Diet combined with exercise helps maintain muscle while optimizing fat loss. Strength training is particularly important for building and maintaining muscle mass. Exercise helps reduce stress, increase energy, and improves fitness. Increasing exercise without diet control, however, may not burn enough calories to loose weight.     Start walking three days a week 10-20 minutes at a time  Work towards walking thirty minutes five days a week   Eventually, increase the speed of your walking for 1-2 minutes at time    In addition, we recommend that you review healthy lifestyles and methods for weight loss available through the National Institutes of Health patient information sites:  http://win.niddk.nih.gov/publications/index.htm    And look into health and wellness programs that may be available through your health insurance provider, employer, local community center, or nabil club.

## 2024-01-31 NOTE — NURSING NOTE
Is the patient currently in the state of [place]? YES    Visit mode:VIDEO    If the visit is dropped, the patient can be reconnected by: VIDEO VISIT: Text to cell phone:   Telephone Information:   Mobile [phone]       Will anyone else be joining the visit? NO  (If patient encounters technical issues they should call [phone]    How would you like to obtain your AVS? MyChart    Are changes needed to the allergy or medication list? Pt stated no med changes    Reason for visit: Consult    Adela RANDALL   Has patient had flu shot for current/most recent flu season? If so, when? Yes: 12/21/23

## 2024-01-31 NOTE — PROGRESS NOTES
Video-Visit Details    Type of service:  Video Visit    Video Visit Start Time:10:46 AM    Video End Time:11:09 AM    Originating Location (pt. Location): Home      Distant Location (provider location):  On-site     Platform used for Video Visit: [facility]    Outpatient Sleep Medicine Consultation:      Name: Yue Rivera MRN# [id number]   Age: 38 year old YOB: 1985     Date of Consultation: January 31, 2024  Consultation is requested by: Merle Alegria,   [address] [place] [address] Merle Alegria  Primary care provider: Merle Alegria       Reason for Sleep Consult:     Yue Rivera is sent by Merle Alegria for a sleep consultation regarding snoirng.    Patient s Reason for visit  Yue Rivera main reason for visit: Snoring and Sleep apnea  Patient states problem(s) started: After my first child  Yue Rivera's goals for this visit: Confirm if I have sleep apnea           Assessment and Plan:     Summary Sleep Diagnoses & Recommendations:     1. Patient has features and risk factors for possible obstructive sleep apnea including: obesity, loud snoring, non-refreshing sleep, daytime fatigue/sleepiness (ESS 5), crowded oropharynx, strong family history of ANGELY and co-morbid DM type 2. The STOP-BANG score is 2/8.   2. Suspected periodic limb movement     --The pathophysiology, diagnosis and treatment of ANGELY was discussed. Will proceed with Polysomnogram (using 4% desaturation/Medicare/ AASM 1B scoring rules) to evaluate for obstructive sleep apnea and periodic limb movement.  Ambien if needed. Patient is a poor candidate for Home Sleep Testing due to symptoms of ANGELY but low pre-test probability of ANGELY  (STOPBANG 2) and suspected PLMD.     3. Recommend weight management.  We discussed the link between obesity, sleep apnea, and health outcomes. Weight loss is recommended to address long term effects of obesity and sleep apnea.         Summary Recommendations:  Orders  Placed This Encounter   Procedures    Comprehensive Sleep Study     Summary Counseling:    Sleep Testing Reviewed  Obstructive Sleep Apnea Reviewed  Complications of Untreated Sleep Apnea Reviewed      Medical Decision-making:   Educational materials provided in instructions    Total time spent reviewing medical records, history and physical examination, review of previous testing and interpretation as well as documentation on this date:46 minutes    CC: Merle Alegria          History of Present Illness:     Past Sleep Evaluations: NA    SLEEP-WAKE SCHEDULE:     Work/School Days: Patient goes to school/work: Yes   Usually gets into bed at 11:30 p.m.  Takes patient about 1-20 minutes to fall asleep  Has trouble falling asleep once in a while nights per week  Wakes up in the middle of the night once in awhile times.  Wakes up due to Snorting self awake;Other  She has trouble falling back asleep once times a week.   It usually takes 1-20 minutes to get back to sleep  Patient is usually up at 6:50 a.m.  Uses alarm: Yes    Weekends/Non-work Days/All Other Days:  Usually gets into bed at 11:30 pm   Takes patient about 10-20 min to fall asleep  Patient is usually up at 7:00 am  Uses alarm: Yes    Sleep Need  Patient gets  6 hours sleep on average. She does not feel rested.    Patient thinks she needs about 8 hours sleep    Yue Rivera prefers to sleep in this position(s): Back;Side   Patient states they do the following activities in bed: Read;Use phone, computer, or tablet    Naps  Patient takes a purposeful nap none times a week and naps are usually na in duration  She feels better after a nap: No  She dozes off unintentionally none days per week  Patient has had a driving accident or near-miss due to sleepiness/drowsiness: No      SLEEP DISRUPTIONS:    Breathing/Snoring  Patient snores:Yes  Other people complain about her snoring: Yes  Patient has been told she stops breathing in her sleep:No  She has issues with  the following: Morning mouth dryness;Stuffy nose when you wake up    Movement:  Patient gets pain, discomfort, with an urge to move:  No  It happens when she is resting:  Yes  It happens more at night:  No  Patient has been told she kicks her legs at night:  Yes     Behaviors in Sleep:  Yue Rivera has experienced the following behaviors while sleeping:    She has experienced sudden muscle weakness during the day: No      Is there anything else you would like your sleep provider to know:        CAFFEINE AND OTHER SUBSTANCES:    Patient consumes caffeinated beverages per day:  2  Last caffeine use is usually: evening  List of any prescribed or over the counter stimulants that patient takes: none  List of any prescribed or over the counter sleep medication patient takes:    List of previous sleep medications that patient has tried:    Patient drinks alcohol to help them sleep:    Patient drinks alcohol near bedtime:      Family History:  Patient has a family member been diagnosed with a sleep disorder:  brother and mother (ANGELY)          SCALES:    EPWORTH SLEEPINESS SCALE         1/31/2024    10:39 AM    Rubicon Sleepiness Scale ( ELEN Clark  [id number]<br>ESS - [place]/English - Final version - 21 Nov 07 - [facility].)   Sitting and reading Slight chance of dozing   Watching TV Slight chance of dozing   Sitting, inactive in a public place (e.g. a theatre or a meeting) Would never doze   As a passenger in a car for an hour without a break Slight chance of dozing   Lying down to rest in the afternoon when circumstances permit Moderate chance of dozing   Sitting and talking to someone Would never doze   Sitting quietly after a lunch without alcohol Would never doze   In a car, while stopped for a few minutes in traffic Would never doze   Rubicon Score (MC) 5   Rubicon Score (Sleep) 5         INSOMNIA SEVERITY INDEX (SANTIAGO)          1/31/2024    10:32 AM   Insomnia Severity Index (SANTIAGO)   Difficulty falling  "asleep 1   Difficulty staying asleep 0   Problems waking up too early 0   How SATISFIED/DISSATISFIED are you with your CURRENT sleep pattern? 3   How NOTICEABLE to others do you think your sleep problem is in terms of impairing the quality of your life? 3   How WORRIED/DISTRESSED are you about your current sleep problem? 2   To what extent do you consider your sleep problem to INTERFERE with your daily functioning (e.g. daytime fatigue, mood, ability to function at work/daily chores, concentration, memory, mood, etc.) CURRENTLY? 2   SANTIAGO Total Score 11       Guidelines for Scoring/Interpretation:  Total score categories:  0-7 = No clinically significant insomnia   8-14 = Subthreshold insomnia   15-21 = Clinical insomnia (moderate severity)  22-28 = Clinical insomnia (severe)  Used via courtesy of www.Vyuth.va.gov with permission from Wale Morris PhD., [facility]      STOP BANG         1/31/2024    10:36 AM   STOP BANG Questionnaire (  2008, the American Society of Anesthesiologists, Inc. [employer]   BMI Clinic: 31.17         GAD7         No data to display                  CAGE-AID         No data to display                CAGE-AID reprinted with permission from the [place] Medical Journal, ALBERT Reina. and CRISTINA Santana, \"Conjoint screening questionnaires for alcohol and drug abuse\" [place] Medical Journal 94: 135-140, 1995.      PATIENT HEALTH QUESTIONNAIRE-9 (PHQ - 9)        3/29/2021    11:41 AM   PHQ-9 ([employer])   1.  Little interest or pleasure in doing things 0   2.  Feeling down, depressed, or hopeless 0       Developed by Garo Guerra, Dyan Goodwin, Glen Sterling and colleagues, with an educational sam from [employer] No permission required to reproduce, translate, display or distribute.        Allergies:    No Known Allergies    Medications:    Current Outpatient Medications   Medication Sig Dispense Refill    levonorgestrel (MIRENA) 20 MCG/DAY IUD " 1 each (20 mcg) by Intrauterine route once      MULTIVITAMIN ORAL       Probiotic Product (FORTIFY PROBIOTIC WOMENS PO)       zolpidem (AMBIEN) 5 MG tablet Take tablet by mouth 15 minutes prior to sleep, for Sleep Study 1 tablet 0       Problem List:  Patient Active Problem List    Diagnosis Date Noted    Type 2 diabetes mellitus without complication, with long-term current use of insulin (H) 01/10/2024     Priority: Medium    Mirena IUD in place - inserted 2022 12/21/2023     Priority: Medium    Cyst of [place]'s duct 04/02/2021     Priority: Medium    Liver cyst 11/05/2019     Priority: Medium     MRI Liver 2019 - benign appearing      Pulmonary nodules 11/05/2019     Priority: Medium     CT scan 2019, stable, small.   CXR 2023 normal      Class 1 obesity due to excess calories without serious comorbidity with body mass index (BMI) of 32.0 to 32.9 in adult 12/17/2018     Priority: Medium        Past Medical/Surgical History:  Past Medical History:   Diagnosis Date    Diabetes mellitus, type 2 (H) 1/10/2024    Liver cyst 11/05/2019    Other abnormal Papanicolaou smear of cervix and cervical HPV(795.09)     Pulmonary nodules 11/05/2019     No past surgical history on file.    Social History:  Social History     Socioeconomic History    Marital status:      Spouse name: Not on file    Number of children: 3    Years of education: Not on file    Highest education level: Not on file   Occupational History    Occupation:  for a school district   Tobacco Use    Smoking status: Never     Passive exposure: Never    Smokeless tobacco: Never   Vaping Use    Vaping Use: Never used   Substance and Sexual Activity    Alcohol use: No    Drug use: No    Sexual activity: Yes     Partners: Male     Birth control/protection: I.U.D.   Other Topics Concern    Parent/sibling w/ CABG, MI or angioplasty before 65F 55M? No   Social History Narrative    Not on file     Social Determinants of Health     Financial Resource  Strain: Low Risk  (12/20/2023)    Financial Resource Strain     Within the past 12 months, have you or your family members you live with been unable to get utilities (heat, electricity) when it was really needed?: No   Food Insecurity: Low Risk  (12/20/2023)    Food Insecurity     Within the past 12 months, did you worry that your food would run out before you got money to buy more?: No     Within the past 12 months, did the food you bought just not last and you didn t have money to get more?: No   Transportation Needs: Low Risk  (12/20/2023)    Transportation Needs     Within the past 12 months, has lack of transportation kept you from medical appointments, getting your medicines, non-medical meetings or appointments, work, or from getting things that you need?: No   Physical Activity: Not on file   Stress: Not on file   Social Connections: Not on file   Interpersonal Safety: Low Risk  (12/21/2023)    Interpersonal Safety     Do you feel physically and emotionally safe where you currently live?: Yes     Within the past 12 months, have you been hit, slapped, kicked or otherwise physically hurt by someone?: No     Within the past 12 months, have you been humiliated or emotionally abused in other ways by your partner or ex-partner?: No   Housing Stability: Low Risk  (12/20/2023)    Housing Stability     Do you have housing? : Yes     Are you worried about losing your housing?: No       Family History:  Family History   Problem Relation Age of Onset    Thyroid Disease Mother     Other Cancer Mother         grade 2, stage IV,  Neuroendocrine Cancer    Anxiety Disorder Mother     Diabetes Father     Hyperlipidemia Father     Hyperlipidemia Maternal Grandmother     Diabetes Type 2  Maternal Grandmother     Hypertension Paternal Grandmother     Osteoporosis Paternal Grandmother        Review of Systems:  A complete review of systems reviewed by me is negative with the exeption of what has been mentioned in the history of  "present illness.        Physical Examination:  Vitals: Ht 1.727 m (5' 8\")   Wt 93 kg (205 lb)   LMP  (LMP Unknown)   BMI 31.17 kg/m    BMI= Body mass index is 31.17 kg/m .         GENERAL APPEARANCE: alert and no distress  EYES: Eyes grossly normal to inspection  HENT: oropharynx crowded  NECK: no asymmetry, masses, or scars  RESP: breathing is non-labored   NEURO: mentation intact and speech normal  PSYCH: affect normal/bright  Mallampati Class:   Tonsillar Stage:          Data: All pertinent previous laboratory data reviewed     Recent Labs   Lab Test 04/23/19 0030 12/17/18  0848     --    POTASSIUM 4.0  --    CHLORIDE 104  --    CO2 24  --    ANIONGAP 10  --     93   BUN 11  --    CR 0.83  --    LONNIE 9.5  --        Recent Labs   Lab Test 04/23/19 0030   WBC 10.0   RBC 4.69   HGB 13.8   HCT 40.9   MCV 87   MCH 29.4   MCHC 33.7   RDW 12.6          Recent Labs   Lab Test 04/23/19 0030   PROTTOTAL 7.1   ALBUMIN 4.1   BILITOTAL 0.7   ALKPHOS 83   AST 20   ALT 18       TSH (uIU/mL)   Date Value   12/21/2023 1.02   12/17/2018 1.05       Chest x-ray:   XR CHEST B READ 2 VIEWS 12/01/2023    Narrative  For Patients: As a result of the 21st Century Cures Act, medical imaging exams and procedure reports are released immediately into your electronic medical record. You may view this report before your referring provider. If you have questions, please contact your health care provider.    EXAM: XR CHEST 2 VIEWS PA AND LATERAL  LOCATION: [facility]  DATE: 12/1/2023    INDICATION: Shortness of breath.  COMPARISON: None.    Impression  Lungs clear.  Pulmonary vascularity normal.  No effusion.    CONCLUSION: Negative chest.      DEMETRIO Nina 1/31/2024           "

## 2024-02-15 ENCOUNTER — ALLIED HEALTH/NURSE VISIT (OUTPATIENT)
Dept: EDUCATION SERVICES | Facility: CLINIC | Age: 39
End: 2024-02-15
Attending: FAMILY MEDICINE
Payer: COMMERCIAL

## 2024-02-15 VITALS — BODY MASS INDEX: 30.94 KG/M2 | WEIGHT: 203.5 LBS

## 2024-02-15 DIAGNOSIS — E11.9 TYPE 2 DIABETES MELLITUS WITHOUT COMPLICATION, WITH LONG-TERM CURRENT USE OF INSULIN (H): ICD-10-CM

## 2024-02-15 DIAGNOSIS — Z79.4 TYPE 2 DIABETES MELLITUS WITHOUT COMPLICATION, WITH LONG-TERM CURRENT USE OF INSULIN (H): ICD-10-CM

## 2024-02-15 PROCEDURE — G0108 DIAB MANAGE TRN  PER INDIV: HCPCS

## 2024-02-15 NOTE — PATIENT INSTRUCTIONS
1. Eat 3 balanced meals each day - Monitor carb intake and limit to 45-60 grams per meal  This would be equal to 3-4 choices ~  1 choice = 15 grams    Do not wait longer than 4-5 hours to eat something  Snacks limit to no more than 30 grams of carbohydrates or 2 choices  Make sure you include protein source with each meal and at bedtime - this has been shown to help with blood glucose elevations    2.  Check blood sugars once each day - please try and alternate the times you check between am fast ing( right after you wake before eating) and 2 hours AFTER dinner - this allows us to get a better idea of what is happening throughout your day , not at just one time     Fasting and before meal target is 80 - 130   2 hours after a meal target is < 180  remember to bring meter and log book to all appointments    3. Incorporate 30 minutes activity into each day - does not need to be all at one time & walking counts    HAVE FUN ON VACATION !!!!!       Thank you for coming in to see me today !      I value your experience and would be very thankful for your time in providing feedback in our clinic survey.    You may receive an e-mail, text message or even something in the mail from [employer].  This is a survey to let us know how we are doing - the survey will be related to your diabetes education and me.

## 2024-02-15 NOTE — LETTER
2/15/2024         RE: Yue Rivera  [address]  [place] [address]        Dear Colleague,    Thank you for referring your patient, Yue Rivera, to the [facility]. Please see a copy of my visit note below.    Diabetes Self-Management Education & Support    Presents for:      Type of Service: In Person Visit      ASSESSMENT:  Yue is a very pleasant 38-year-old who comes to clinic today to receive initial education for new diagnosis of DMT2.  She arrived today unaccompanied.  Prior to her visit today I did have the opportunity to review her most recent lab results as well as office visit notes.  Yue is  and works for the [email or web address] for [employer].  Her and her  have 3 boys ages 11, 9, and 7.  Her current A1c is meeting ADA goal of less than 7% and no medications have yet been prescribed for her for diabetes management.  She was very receptive to all the information/education that was provided to her today.  She reports no known family history of diabetes.    Patient's most recent   Lab Results   Component Value Date    A1C 6.9 12/21/2023     is meeting goal of <7.0    Diabetes knowledge and skills assessment:       Continue education with the following diabetes management concepts: Healthy Eating, Being Active, Monitoring, Reducing Risks, and Healthy Coping    Based on learning assessment above, most appropriate setting for further diabetes education would be: Individual setting.      PLAN    See Patient Instructions for co-developed, patient-stated behavior change goals.  AVS printed and provided to patient today. See Follow-Up section for recommended follow-up.       Topics to cover at upcoming visits: Healthy Eating, Being Active, Reducing Risks, and Healthy Coping    Follow-up: 4/17/24    See Care Plan for co-developed, patient-state behavior change goals.  AVS provided for patient today.    Education Materials  "Provided:  Living Healthy with Diabetes, BG Log Sheet, Carbohydrate Counting, and My Plate Planner      SUBJECTIVE/OBJECTIVE:  Diabetes type: Type 2  Disease course: Other (see Comments)  Cultural Influences/Ethnic Background:  Not  or       Diabetes Symptoms & Complications:  Diabetes Related Symptoms: None  Weight trend: Decreasing  Symptom course: Stable  Disease course: Other (see Comments)       Patient Problem List and Family Medical History reviewed for relevant medical history, current medical status, and diabetes risk factors.    Vitals:  Wt 92.3 kg (203 lb 8 oz)   LMP  (LMP Unknown)   BMI 30.94 kg/m    Estimated body mass index is 30.94 kg/m  as calculated from the following:    Height as of 1/31/24: 1.727 m (5' 8\").    Weight as of this encounter: 92.3 kg (203 lb 8 oz).   Last 3 BP:   BP Readings from Last 3 Encounters:   12/21/23 132/71   09/06/22 113/65   03/29/21 113/76       History   Smoking Status     Never   Smokeless Tobacco     Never       Labs:  Lab Results   Component Value Date    A1C 6.9 12/21/2023     Lab Results   Component Value Date     04/23/2019     Lab Results   Component Value Date    LDL 63 12/21/2023     Direct Measure HDL   Date Value Ref Range Status   12/21/2023 54 >=50 mg/dL Final   ]  GFR Estimate   Date Value Ref Range Status   04/23/2019 >60 >60 mL/min/1.73m2 Final     GFR Estimate If Black   Date Value Ref Range Status   04/23/2019 >60 >60 mL/min/1.73m2 Final     Lab Results   Component Value Date    CR 0.83 04/23/2019     No results found for: \"MICROALBUMIN\"    Healthy Eating:  Meal planning/habits: Avoiding sweets, Calorie counting, Keeps food records    Being Active:       Monitoring:  Times checking blood sugar at home (number): Never  Times checking blood sugar at home (per): Day        Taking Medications:      Current Treatments: None    Problem Solving:       Hypoglycemia Symptoms  Hypoglycemia: None    Hypoglycemia Complications  Hypoglycemia " Complications: None    Reducing Risks:       Healthy Coping:     Patient Activation Measure Survey Score:       No data to display                  Care Plan and Education Provided:  Patient was instructed on Accu-Chek Guide Me meter and was able to provide an accurate return demonstration. Patient's blood glucose reading today was 90 mg/dL.  Care Plan: Diabetes   Updates made by Shahrzad Miner RN since 2/19/2024 12:00 AM        Problem: HbA1C Not In Goal         Goal: Establish Regular Follow-Ups with PCP    This Visit's Progress: 100%        Task: Discuss with PCP the recommended timing for patient's next follow up visit(s) Completed 2/19/2024   Responsible User: Shahrzad Miner RN        Task: Discuss schedule for PCP visits with patient Completed 2/19/2024   Responsible User: Shahrzad Miner RN        Goal: Get HbA1C Level in Goal    This Visit's Progress: 100%   Note:    Current A1c = 6.9%       Task: Educate patient on diabetes education self-management topics Completed 2/19/2024   Responsible User: Shahrzad Miner RN        Task: Educate patient on benefits of regular glucose monitoring Completed 2/19/2024   Responsible User: Shahrzad Miner RN        Task: Refer patient to appropriate extended care team member, as needed (Medication Therapy Management, Behavioral Health, Physical Therapy, etc.)    Responsible User: Shahrzad Miner RN        Task: Discuss diabetes treatment plan with patient Completed 2/19/2024   Responsible User: Shahrzad Miner RN        Problem: Diabetes Self-Management Education Needed to Optimize Self-Care Behaviors         Goal: Understand diabetes pathophysiology and disease progression    This Visit's Progress: 100%        Task: Provide education on diabetes pathophysiology and disease progression specfic to patient's diabetes type    Responsible User: Shahrzad Miner RN        Goal: Healthy Eating - follow a healthy eating pattern for diabetes    This Visit's  Progress: 80%        Task: Provide education on portion control and consistency in amount, composition and timing of food intake Completed 2/19/2024   Responsible User: Shahrzad Miner RN        Task: Provide education on managing carbohydrate intake (carbohydrate counting, plate planning method, etc.) Completed 2/19/2024   Responsible User: Shahrzad Miner RN        Task: Provide education on weight management    Responsible User: Shahrzad Miner RN        Task: Provide education on heart healthy eating    Responsible User: Shahrzad Miner RN        Task: Provide education on eating out    Responsible User: Shahrzad Miner RN        Task: Develop individualized healthy eating plan with patient Completed 2/19/2024   Responsible User: Shahrzad Miner RN        Goal: Being Active - get regular physical activity, working up to at least 150 minutes per week    This Visit's Progress: 60%        Task: Provide education on relationship of activity to glucose and precautions to take if at risk for low glucose Completed 2/19/2024   Responsible User: Shahrzad Miner RN        Task: Discuss barriers to physical activity with patient Completed 2/19/2024   Responsible User: Shahrzad Miner RN        Task: Develop physical activity plan with patient    Responsible User: Shahrzad Miner RN        Task: Explore community resources including walking groups, assistance programs, and home videos    Responsible User: Shahrzad Miner RN        Goal: Monitoring - monitor glucose and ketones as directed    This Visit's Progress: 80%        Task: Provide education on blood glucose monitoring (purpose, proper technique, frequency, glucose targets, interpreting results, when to use glucose control solution, sharps disposal) Completed 2/19/2024   Responsible User: Shahrzad Miner RN        Task: Provide education on continuous glucose monitoring (sensor placement, use of rogerio or /reader, understanding glucose  trends, alerts and alarms, differences between sensor glucose and blood glucose)    Responsible User: Shahrzad Miner RN        Task: Provide education on ketone monitoring (when to monitor, frequency, etc.)    Responsible User: Shahrzad Miner RN        Goal: Taking Medication - patient is consistently taking medications as directed         Task: Provide education on action of prescribed medication, including when to take and possible side effects    Responsible User: Shahrzad Miner RN        Task: Provide education on insulin and injectable diabetes medications, including administration, storage, site selection and rotation for injection sites    Responsible User: Shahrzad Miner RN        Task: Discuss barriers to medication adherence with patient and provide management technique ideas as appropriate    Responsible User: Shahrzad Miner RN        Task: Provide education on frequency and refill details of medications    Responsible User: Shahrzad Miner RN        Goal: Problem Solving - know how to prevent and manage short-term diabetes complications    This Visit's Progress: 40%   Note:    Education on sick days and safe travel was provided in the literature given to patient today       Task: Provide education on high blood glucose - causes, signs/symptoms, prevention and treatment Completed 2/19/2024   Responsible User: Shahrzad Miner RN        Task: Provide education on low blood glucose - causes, signs/symptoms, prevention, treatment, carrying a carbohydrate source at all times, and medical identification Completed 2/19/2024   Responsible User: Shahrzad Miner RN        Task: Provide education on safe travel with diabetes    Responsible User: Shahrzad Miner RN        Task: Provide education on how to care for diabetes on sick days    Responsible User: Shahrzad Miner RN        Task: Provide education on when to call a health care provider    Responsible User: Shahrzad Miner RN         Goal: Reducing Risks - know how to prevent and treat long-term diabetes complications    This Visit's Progress: 80%        Task: Provide education on major complications of diabetes, prevention, early diagnostic measures and treatment of complications Completed 2/19/2024   Responsible User: Shahrzad Miner RN        Task: Provide education on recommended care for dental, eye and foot health    Responsible User: Shahrzad Miner RN        Task: Provide education on Hemoglobin A1c - goals and relationship to blood glucose levels Completed 2/19/2024   Responsible User: Shahrzad Miner RN        Task: Provide education on recommendations for heart health - lipid levels and goals, blood pressure and goals, and aspirin therapy, if indicated    Responsible User: Shahrzad Miner RN        Task: Provide education on tobacco cessation    Responsible User: Shahrzad Miner RN        Goal: Healthy Coping - use available resources to cope with the challenges of managing diabetes    This Visit's Progress: 70%   Note:    Was very receptive to all the information/education that was provided to her today       Task: Discuss recognizing feelings about having diabetes Completed 2/19/2024   Responsible User: Shahrzad Miner RN        Task: Provide education on the benefits of making appropriate lifestyle changes Completed 2/19/2024   Responsible User: Shahrzad Miner RN        Task: Provide education on benefits of utilizing support systems Completed 2/19/2024   Responsible User: Shahrzad Miner RN        Task: Discuss methods for coping with stress    Responsible User: Shahrzad Miner RN        Task: Provide education on when to seek professional counseling    Responsible User: Shahrzad Miner RN          Thank you,  Shahrzad Miner RN [facility]  Certified Diabetes Care and   Visit type : DSMT      Time Spent: 60 minutes  Encounter Type: Individual    Any diabetes medication dose changes were made  via the CDE Protocol per the patient's referring provider and primary care provider. A copy of this encounter was shared with the provider.   Much or all of the text in this note was generated through the use of the Dragon Dictate voice-to-text software.Errors in spelling or words which seem out of context are unintentional. Sound alike errors, in particular, may have escaped editing.

## 2024-02-19 RX ORDER — BLOOD SUGAR DIAGNOSTIC
STRIP MISCELLANEOUS
Qty: 100 STRIP | Refills: 4 | Status: SHIPPED | OUTPATIENT
Start: 2024-02-19

## 2024-02-19 RX ORDER — LANCETS
EACH MISCELLANEOUS
Qty: 100 EACH | Refills: 4 | Status: SHIPPED | OUTPATIENT
Start: 2024-02-19

## 2024-02-19 NOTE — PROGRESS NOTES
Diabetes Self-Management Education & Support    Presents for:      Type of Service: In Person Visit      ASSESSMENT:  Yue is a very pleasant 38-year-old who comes to clinic today to receive initial education for new diagnosis of DMT2.  She arrived today unaccompanied.  Prior to her visit today I did have the opportunity to review her most recent lab results as well as office visit notes.  Yue is  and works for the [employer] for [employer].  Her and her  have 3 boys ages 11, 9, and 7.  Her current A1c is meeting ADA goal of less than 7% and no medications have yet been prescribed for her for diabetes management.  She was very receptive to all the information/education that was provided to her today.  She reports no known family history of diabetes.    Patient's most recent   Lab Results   Component Value Date    A1C 6.9 12/21/2023     is meeting goal of <7.0    Diabetes knowledge and skills assessment:       Continue education with the following diabetes management concepts: Healthy Eating, Being Active, Monitoring, Reducing Risks, and Healthy Coping    Based on learning assessment above, most appropriate setting for further diabetes education would be: Individual setting.      PLAN    See Patient Instructions for co-developed, patient-stated behavior change goals.  AVS printed and provided to patient today. See Follow-Up section for recommended follow-up.       Topics to cover at upcoming visits: Healthy Eating, Being Active, Reducing Risks, and Healthy Coping    Follow-up: 4/17/24    See Care Plan for co-developed, patient-state behavior change goals.  AVS provided for patient today.    Education Materials Provided:  Living Healthy with Diabetes, BG Log Sheet, Carbohydrate Counting, and My Plate Planner      SUBJECTIVE/OBJECTIVE:  Diabetes type: Type 2  Disease course: Other (see Comments)  Cultural Influences/Ethnic Background:  Not  or  "      Diabetes Symptoms & Complications:  Diabetes Related Symptoms: None  Weight trend: Decreasing  Symptom course: Stable  Disease course: Other (see Comments)       Patient Problem List and Family Medical History reviewed for relevant medical history, current medical status, and diabetes risk factors.    Vitals:  Wt 92.3 kg (203 lb 8 oz)   LMP  (LMP Unknown)   BMI 30.94 kg/m    Estimated body mass index is 30.94 kg/m  as calculated from the following:    Height as of 1/31/24: 1.727 m (5' 8\").    Weight as of this encounter: 92.3 kg (203 lb 8 oz).   Last 3 BP:   BP Readings from Last 3 Encounters:   12/21/23 132/71   09/06/22 113/65   03/29/21 113/76       History   Smoking Status    Never   Smokeless Tobacco    Never       Labs:  Lab Results   Component Value Date    A1C 6.9 12/21/2023     Lab Results   Component Value Date     04/23/2019     Lab Results   Component Value Date    LDL 63 12/21/2023     Direct Measure HDL   Date Value Ref Range Status   12/21/2023 54 >=50 mg/dL Final   ]  GFR Estimate   Date Value Ref Range Status   04/23/2019 >60 >60 mL/min/1.73m2 Final     GFR Estimate If Black   Date Value Ref Range Status   04/23/2019 >60 >60 mL/min/1.73m2 Final     Lab Results   Component Value Date    CR 0.83 04/23/2019     No results found for: \"MICROALBUMIN\"    Healthy Eating:  Meal planning/habits: Avoiding sweets, Calorie counting, Keeps food records    Being Active:       Monitoring:  Times checking blood sugar at home (number): Never  Times checking blood sugar at home (per): Day        Taking Medications:      Current Treatments: None    Problem Solving:       Hypoglycemia Symptoms  Hypoglycemia: None    Hypoglycemia Complications  Hypoglycemia Complications: None    Reducing Risks:       Healthy Coping:     Patient Activation Measure Survey Score:       No data to display                  Care Plan and Education Provided:  Patient was instructed on Accu-Chek Guide Me meter and was able " to provide an accurate return demonstration. Patient's blood glucose reading today was 90 mg/dL.  Care Plan: Diabetes   Updates made by Shahrzad Miner RN since 2/19/2024 12:00 AM        Problem: HbA1C Not In Goal         Goal: Establish Regular Follow-Ups with PCP    This Visit's Progress: 100%        Task: Discuss with PCP the recommended timing for patient's next follow up visit(s) Completed 2/19/2024   Responsible User: Shahrzad Miner RN        Task: Discuss schedule for PCP visits with patient Completed 2/19/2024   Responsible User: Shahrzad Miner RN        Goal: Get HbA1C Level in Goal    This Visit's Progress: 100%   Note:    Current A1c = 6.9%       Task: Educate patient on diabetes education self-management topics Completed 2/19/2024   Responsible User: Shahrzad Miner RN        Task: Educate patient on benefits of regular glucose monitoring Completed 2/19/2024   Responsible User: Shahrzad Miner RN        Task: Refer patient to appropriate extended care team member, as needed (Medication Therapy Management, Behavioral Health, Physical Therapy, etc.)    Responsible User: Shahrzad Miner RN        Task: Discuss diabetes treatment plan with patient Completed 2/19/2024   Responsible User: Shahrzad Miner RN        Problem: Diabetes Self-Management Education Needed to Optimize Self-Care Behaviors         Goal: Understand diabetes pathophysiology and disease progression    This Visit's Progress: 100%        Task: Provide education on diabetes pathophysiology and disease progression specfic to patient's diabetes type    Responsible User: Shahrzad Miner RN        Goal: Healthy Eating - follow a healthy eating pattern for diabetes    This Visit's Progress: 80%        Task: Provide education on portion control and consistency in amount, composition and timing of food intake Completed 2/19/2024   Responsible User: Shahrzad Miner RN        Task: Provide education on managing carbohydrate intake  (carbohydrate counting, plate planning method, etc.) Completed 2/19/2024   Responsible User: Shahrzad Miner RN        Task: Provide education on weight management    Responsible User: Shahrzad Miner RN        Task: Provide education on heart healthy eating    Responsible User: Shahrzad Miner RN        Task: Provide education on eating out    Responsible User: Shahrzad Miner RN        Task: Develop individualized healthy eating plan with patient Completed 2/19/2024   Responsible User: Shahrzad Miner RN        Goal: Being Active - get regular physical activity, working up to at least 150 minutes per week    This Visit's Progress: 60%        Task: Provide education on relationship of activity to glucose and precautions to take if at risk for low glucose Completed 2/19/2024   Responsible User: Shahrzad Miner RN        Task: Discuss barriers to physical activity with patient Completed 2/19/2024   Responsible User: Shahrzad Miner RN        Task: Develop physical activity plan with patient    Responsible User: Shahrzad Miner RN        Task: Explore community resources including walking groups, assistance programs, and home videos    Responsible User: Shahrzad Miner RN        Goal: Monitoring - monitor glucose and ketones as directed    This Visit's Progress: 80%        Task: Provide education on blood glucose monitoring (purpose, proper technique, frequency, glucose targets, interpreting results, when to use glucose control solution, sharps disposal) Completed 2/19/2024   Responsible User: Shahrzad Miner RN        Task: Provide education on continuous glucose monitoring (sensor placement, use of rogerio or /reader, understanding glucose trends, alerts and alarms, differences between sensor glucose and blood glucose)    Responsible User: Shahrzad Miner RN        Task: Provide education on ketone monitoring (when to monitor, frequency, etc.)    Responsible User: Shahrzad Miner RN         Goal: Taking Medication - patient is consistently taking medications as directed         Task: Provide education on action of prescribed medication, including when to take and possible side effects    Responsible User: Shahrzad Miner RN        Task: Provide education on insulin and injectable diabetes medications, including administration, storage, site selection and rotation for injection sites    Responsible User: Shahrzad Miner RN        Task: Discuss barriers to medication adherence with patient and provide management technique ideas as appropriate    Responsible User: Shahrzad Miner RN        Task: Provide education on frequency and refill details of medications    Responsible User: Shahrzad Miner RN        Goal: Problem Solving - know how to prevent and manage short-term diabetes complications    This Visit's Progress: 40%   Note:    Education on sick days and safe travel was provided in the literature given to patient today       Task: Provide education on high blood glucose - causes, signs/symptoms, prevention and treatment Completed 2/19/2024   Responsible User: Shahrzad Miner RN        Task: Provide education on low blood glucose - causes, signs/symptoms, prevention, treatment, carrying a carbohydrate source at all times, and medical identification Completed 2/19/2024   Responsible User: Shahrzad Miner RN        Task: Provide education on safe travel with diabetes    Responsible User: Shahrzad Miner RN        Task: Provide education on how to care for diabetes on sick days    Responsible User: Shahrzad Miner RN        Task: Provide education on when to call a health care provider    Responsible User: Shahrzad Miner RN        Goal: Reducing Risks - know how to prevent and treat long-term diabetes complications    This Visit's Progress: 80%        Task: Provide education on major complications of diabetes, prevention, early diagnostic measures and treatment of complications  Completed 2/19/2024   Responsible User: Shahrzad Miner RN        Task: Provide education on recommended care for dental, eye and foot health    Responsible User: Shahrzad Miner RN        Task: Provide education on Hemoglobin A1c - goals and relationship to blood glucose levels Completed 2/19/2024   Responsible User: Shahrzad Miner RN        Task: Provide education on recommendations for heart health - lipid levels and goals, blood pressure and goals, and aspirin therapy, if indicated    Responsible User: Shahrzad Miner RN        Task: Provide education on tobacco cessation    Responsible User: Shahrzad Miner RN        Goal: Healthy Coping - use available resources to cope with the challenges of managing diabetes    This Visit's Progress: 70%   Note:    Was very receptive to all the information/education that was provided to her today       Task: Discuss recognizing feelings about having diabetes Completed 2/19/2024   Responsible User: Shahrzad Miner RN        Task: Provide education on the benefits of making appropriate lifestyle changes Completed 2/19/2024   Responsible User: Shahrzad Miner RN        Task: Provide education on benefits of utilizing support systems Completed 2/19/2024   Responsible User: Shahrzad Miner RN        Task: Discuss methods for coping with stress    Responsible User: Shahrzad Miner RN        Task: Provide education on when to seek professional counseling    Responsible User: Shahrzad Miner RN          Thank you,  Shahrzad Miner RN [facility]  Certified Diabetes Care and   Visit type : DSMT      Time Spent: 60 minutes  Encounter Type: Individual    Any diabetes medication dose changes were made via the CDE Protocol per the patient's referring provider and primary care provider. A copy of this encounter was shared with the provider.   Much or all of the text in this note was generated through the use of the Dragon Dictate voice-to-text  software.Errors in spelling or words which seem out of context are unintentional. Sound alike errors, in particular, may have escaped editing.

## 2024-03-11 ENCOUNTER — LAB (OUTPATIENT)
Dept: LAB | Facility: CLINIC | Age: 39
End: 2024-03-11
Payer: COMMERCIAL

## 2024-03-11 DIAGNOSIS — Z79.4 TYPE 2 DIABETES MELLITUS WITHOUT COMPLICATION, WITH LONG-TERM CURRENT USE OF INSULIN (H): ICD-10-CM

## 2024-03-11 DIAGNOSIS — E11.9 TYPE 2 DIABETES MELLITUS WITHOUT COMPLICATION, WITH LONG-TERM CURRENT USE OF INSULIN (H): ICD-10-CM

## 2024-03-11 LAB — HBA1C MFR BLD: 5.5 % (ref 0–5.6)

## 2024-03-11 PROCEDURE — 36415 COLL VENOUS BLD VENIPUNCTURE: CPT

## 2024-03-11 PROCEDURE — 83036 HEMOGLOBIN GLYCOSYLATED A1C: CPT

## 2024-03-11 NOTE — RESULT ENCOUNTER NOTE
Patient updated by [employer] message with lab results.       A1c much improved. Great work!  Merle Alegria, DO

## 2024-07-14 ENCOUNTER — HEALTH MAINTENANCE LETTER (OUTPATIENT)
Age: 39
End: 2024-07-14

## 2024-07-29 ENCOUNTER — ANCILLARY PROCEDURE (OUTPATIENT)
Dept: MAMMOGRAPHY | Facility: CLINIC | Age: 39
End: 2024-07-29
Attending: FAMILY MEDICINE
Payer: COMMERCIAL

## 2024-07-29 DIAGNOSIS — R92.8 ABNORMAL MAMMOGRAM: Primary | ICD-10-CM

## 2024-07-29 DIAGNOSIS — R92.8 ABNORMAL MAMMOGRAM: ICD-10-CM

## 2024-07-29 PROCEDURE — 77061 BREAST TOMOSYNTHESIS UNI: CPT | Mod: LT

## 2024-07-29 NOTE — PROGRESS NOTES
1. Abnormal mammogram  - MA Diagnostic Digital Bilateral; Future     Follow up orders placed for 6 months as recommended.    Merle Alegria, DO

## 2024-10-03 ENCOUNTER — THERAPY VISIT (OUTPATIENT)
Dept: SLEEP MEDICINE | Facility: CLINIC | Age: 39
End: 2024-10-03
Attending: PHYSICIAN ASSISTANT
Payer: COMMERCIAL

## 2024-10-03 DIAGNOSIS — E66.811 CLASS 1 OBESITY DUE TO EXCESS CALORIES WITH SERIOUS COMORBIDITY AND BODY MASS INDEX (BMI) OF 31.0 TO 31.9 IN ADULT: ICD-10-CM

## 2024-10-03 DIAGNOSIS — R06.00 DYSPNEA AND RESPIRATORY ABNORMALITY: ICD-10-CM

## 2024-10-03 DIAGNOSIS — G47.61 PERIODIC LIMB MOVEMENT DISORDER: ICD-10-CM

## 2024-10-03 DIAGNOSIS — R06.83 SNORING: ICD-10-CM

## 2024-10-03 DIAGNOSIS — E66.09 CLASS 1 OBESITY DUE TO EXCESS CALORIES WITH SERIOUS COMORBIDITY AND BODY MASS INDEX (BMI) OF 31.0 TO 31.9 IN ADULT: ICD-10-CM

## 2024-10-03 DIAGNOSIS — R53.83 MALAISE AND FATIGUE: ICD-10-CM

## 2024-10-03 DIAGNOSIS — Z72.820 LACK OF ADEQUATE SLEEP: ICD-10-CM

## 2024-10-03 DIAGNOSIS — R53.81 MALAISE AND FATIGUE: ICD-10-CM

## 2024-10-03 DIAGNOSIS — R06.89 DYSPNEA AND RESPIRATORY ABNORMALITY: ICD-10-CM

## 2024-10-03 DIAGNOSIS — G47.33 OSA (OBSTRUCTIVE SLEEP APNEA): Primary | Chronic | ICD-10-CM

## 2024-10-03 PROCEDURE — 95810 POLYSOM 6/> YRS 4/> PARAM: CPT | Performed by: INTERNAL MEDICINE

## 2024-10-03 NOTE — Clinical Note
Do you want 3% rules? Arousal index was normal at 16.4 arousals per hour.   Time in REM supine was 111.0 minutes. The combined apnea/hypopnea index was 4.3 events per hour (central apnea/hypopnea index was 0.3 events per hour). The (supine) REM AHI was 13.5 events per hour. The supine AHI was 5.1 events per hour. The RERA index was 2.8 events per hour.  The RDI was 7.1 events per hour.

## 2024-10-15 PROBLEM — G47.33 OSA (OBSTRUCTIVE SLEEP APNEA): Chronic | Status: ACTIVE | Noted: 2024-10-15

## 2024-10-15 NOTE — PROCEDURES
" SLEEP STUDY INTERPRETATION  DIAGNOSTIC POLYSOMNOGRAPHY REPORT      Patient: ALYX ROCHA  YOB: 1985  Study Date: 10/3/2024  MRN: [id number]  Referring Provider: -  Ordering Provider: DEMETRIO Peres    Indications for Polysomnography: The patient is a 38 year old Female who is 5' 8\" and weighs 203.0 lbs. Her BMI is 31.1, [place] sleepiness scale 5 and neck circumference is 38 cm. A diagnostic polysomnogram was performed to evaluate for loud snoring, non-refreshing sleep, daytime fatigue/sleepiness (ESS 5), crowded oropharynx, strong family history of ANGELY and co-morbid DM type 2.    Polysomnogram Data: A full night polysomnogram recorded the standard physiologic parameters including EEG, EOG, EMG, ECG, nasal and oral airflow. Respiratory parameters of chest and abdominal movements were recorded with respiratory inductance plethysmography. Oxygen saturation was recorded by pulse oximetry. Hypopnea scoring rule used: 1A 3%.    Sleep Architecture:    The total recording time of the polysomnogram was 494.5 minutes. The total sleep time was 472.5 minutes. Sleep latency was normal at 16.1 minutes without the use of a sleep aid. REM latency was 101.0 minutes. Arousal index was normal at 16.4 arousals per hour. Sleep efficiency was normal at 95.6%. Wake after sleep onset was 5.5 minutes. The patient spent 0.1% of total sleep time in Stage N1, 57.4% in Stage N2, 19.0% in Stage N3, and 23.5% in REM. Time in REM supine was 111.0 minutes.    Respiration:  scored using 3% rules  Events ? The polysomnogram revealed a presence of 27 obstructive, 3 central, and 0 mixed apneas resulting in an apnea index of 3.8 events per hour. There were 21 obstructive hypopneas and 0 central hypopneas resulting in an obstructive hypopnea index of 2.7 and central hypopnea index of 0 events per hour. The combined apnea/hypopnea index was 6.5 events per hour (central apnea/hypopnea index was 0.4 events per hour). The REM AHI was 16.2 " events per hour. The supine AHI was 7.7 events per hour. The RERA index was 3.7 events per hour.  The RDI was 10.2 events per hour.  Snoring - was reported as moderate and intermittent.  Respiratory rate and pattern - was notable for normal respiratory rate and pattern.  Sustained Sleep Associated Hypoventilation - Transcutaneous carbon dioxide monitoring was not used, however significant hypoventilation was not suggested by oximetry   Sleep Associated Hypoxemia - (Greater than 5 minutes O2 sat at or below 88%) was not present. Baseline oxygen saturation was 96.2%. Lowest oxygen saturation was 89.0%. Time spent less than or equal to 88% was 0 minutes. Time spent less than or equal to 89% was 0.1 minutes.    Movement Activity:    Periodic Limb Activity - There were 52 PLMs during the entire study. The PLM index was 6.6 movements per hour. The PLM Arousal Index was 0.1 per hour.  REM EMG Activity - Excessive transient/sustained muscle activity was not present.  Nocturnal Behavior - Abnormal sleep related behaviors were not noted    Bruxism - None apparent.        Cardiac Summary:    The average pulse rate was 58.8 bpm. The minimum pulse rate was 50.0 bpm while the maximum pulse rate was 93.0 bpm.  Arrhythmias were not noted.          Assessment:   Mild obstructive sleep apnea    Recommendations:  Based on the presence of mild obstructive sleep apnea and symptoms or comorbidities, treatment could be empirically initiated with Auto?titrating PAP therapy with a range of 5 to 15 cmH2O. Recommend clinical follow up with sleep management team.  Patient may be a candidate for dental appliance through referral to Sleep Dentistry for the treatment of obstructive sleep apnea and/or socially disruptive snoring.  Advice regarding the risks of drowsy driving.  Suggest optimizing sleep schedule    Weight management (if BMI > 30).  Pharmacologic therapy should be used for management of restless legs syndrome only if present and  clinically indicated and not based on the presence of periodic limb movements alone.    Diagnostic Codes:   Obstructive Sleep Apnea G47.33          _____________________________________   Electronically Signed By: Dionte Reed MD 10/9/24           Range(%) Time in range (min)   0.0 - 89.0 0.1   0.0 - 88.0 -         Stage Min(mm Hg) Max(mm Hg)   Wake - -   NREM(1+2+3) - -   REM - -       Range(mmHg) Time in range (min)   55.0 - 100.0 -   Excluded data <20.0 & >65.0 495.0

## 2024-10-16 LAB — SLPCOMP: NORMAL

## 2024-10-20 ENCOUNTER — MYC MEDICAL ADVICE (OUTPATIENT)
Dept: SLEEP MEDICINE | Facility: CLINIC | Age: 39
End: 2024-10-20
Payer: COMMERCIAL

## 2024-10-20 DIAGNOSIS — G47.33 OSA (OBSTRUCTIVE SLEEP APNEA): Primary | ICD-10-CM

## 2024-10-20 DIAGNOSIS — G47.09 OTHER INSOMNIA: ICD-10-CM

## 2024-10-31 PROBLEM — G47.00 INSOMNIA: Status: ACTIVE | Noted: 2024-10-31

## 2024-11-20 ENCOUNTER — DOCUMENTATION ONLY (OUTPATIENT)
Dept: SLEEP MEDICINE | Facility: CLINIC | Age: 39
End: 2024-11-20
Payer: COMMERCIAL

## 2024-11-20 ENCOUNTER — VIRTUAL VISIT (OUTPATIENT)
Dept: SLEEP MEDICINE | Facility: CLINIC | Age: 39
End: 2024-11-20
Payer: COMMERCIAL

## 2024-11-20 VITALS — HEIGHT: 68 IN | BODY MASS INDEX: 30.46 KG/M2 | WEIGHT: 201 LBS

## 2024-11-20 DIAGNOSIS — G47.33 OBSTRUCTIVE SLEEP APNEA (ADULT) (PEDIATRIC): Primary | ICD-10-CM

## 2024-11-20 DIAGNOSIS — G47.33 OSA (OBSTRUCTIVE SLEEP APNEA): Primary | ICD-10-CM

## 2024-11-20 PROCEDURE — 99213 OFFICE O/P EST LOW 20 MIN: CPT | Mod: 95 | Performed by: PHYSICIAN ASSISTANT

## 2024-11-20 ASSESSMENT — SLEEP AND FATIGUE QUESTIONNAIRES
HOW LIKELY ARE YOU TO NOD OFF OR FALL ASLEEP WHILE SITTING QUIETLY AFTER LUNCH WITHOUT ALCOHOL: WOULD NEVER DOZE
HOW LIKELY ARE YOU TO NOD OFF OR FALL ASLEEP WHILE SITTING AND TALKING TO SOMEONE: WOULD NEVER DOZE
HOW LIKELY ARE YOU TO NOD OFF OR FALL ASLEEP WHILE WATCHING TV: WOULD NEVER DOZE
HOW LIKELY ARE YOU TO NOD OFF OR FALL ASLEEP WHEN YOU ARE A PASSENGER IN A CAR FOR AN HOUR WITHOUT A BREAK: SLIGHT CHANCE OF DOZING
HOW LIKELY ARE YOU TO NOD OFF OR FALL ASLEEP IN A CAR, WHILE STOPPED FOR A FEW MINUTES IN TRAFFIC: WOULD NEVER DOZE
HOW LIKELY ARE YOU TO NOD OFF OR FALL ASLEEP WHILE SITTING INACTIVE IN A PUBLIC PLACE: WOULD NEVER DOZE
HOW LIKELY ARE YOU TO NOD OFF OR FALL ASLEEP WHILE LYING DOWN TO REST IN THE AFTERNOON WHEN CIRCUMSTANCES PERMIT: HIGH CHANCE OF DOZING
HOW LIKELY ARE YOU TO NOD OFF OR FALL ASLEEP WHILE SITTING AND READING: SLIGHT CHANCE OF DOZING

## 2024-11-20 ASSESSMENT — PAIN SCALES - GENERAL: PAINLEVEL_OUTOF10: NO PAIN (0)

## 2024-11-20 NOTE — PATIENT INSTRUCTIONS
Your Body mass index is 30.56 kg/m .  Weight management is a personal decision.  If you are interested in exploring weight loss strategies, the following discussion covers the approaches that may be successful. Body mass index (BMI) is one way to tell whether you are at a healthy weight, overweight, or obese. It measures your weight in relation to your height.  A BMI of 18.5 to 24.9 is in the healthy range. A person with a BMI of 25 to 29.9 is considered overweight, and someone with a BMI of 30 or greater is considered obese. More than two-thirds of American adults are considered overweight or obese.  Being overweight or obese increases the risk for further weight gain. Excess weight may lead to heart disease and diabetes.  Creating and following plans for healthy eating and physical activity may help you improve your health.  Weight control is part of healthy lifestyle and includes exercise, emotional health, and healthy eating habits. Careful eating habits lifelong are the mainstay of weight control. Though there are significant health benefits from weight loss, long-term weight loss with diet alone may be very difficult to achieve- studies show long-term success with dietary management in less than 10% of people. Attaining a healthy weight may be especially difficult to achieve in those with severe obesity. In some cases, medications, devices and surgical management might be considered.  What can you do?  If you are overweight or obese and are interested in methods for weight loss, you should discuss this with your provider.   Consider reducing daily calorie intake by 500 calories.   Keep a food journal.   Avoiding skipping meals, consider cutting portions instead.    Diet combined with exercise helps maintain muscle while optimizing fat loss. Strength training is particularly important for building and maintaining muscle mass. Exercise helps reduce stress, increase energy, and improves fitness. Increasing  exercise without diet control, however, may not burn enough calories to loose weight.     Start walking three days a week 10-20 minutes at a time  Work towards walking thirty minutes five days a week   Eventually, increase the speed of your walking for 1-2 minutes at time    In addition, we recommend that you review healthy lifestyles and methods for weight loss available through the National Institutes of Health patient information sites:  http://win.niddk.nih.gov/publications/index.htm    And look into health and wellness programs that may be available through your health insurance provider, employer, local community center, or nabil club.

## 2024-11-20 NOTE — NURSING NOTE
Current patient location:  [address] [place] [address]    Is the patient currently in the state of [place]? YES    Visit mode:VIDEO    If the visit is dropped, the patient can be reconnected by:VIDEO VISIT: Send to e-mail at: [email or web address]    Will anyone else be joining the visit? NO  (If patient encounters technical issues they should call [phone]    Are changes needed to the allergy or medication list? No    Are refills needed on medications prescribed by this physician? NO    Rooming Documentation:  Questionnaire(s) completed    Reason for visit: RECHECK    Angelica GARCIAF

## 2024-11-20 NOTE — PROGRESS NOTES
Virtual Visit Details    Type of service:  Video Visit     Originating Location (pt. Location): Home    Distant Location (provider location):  On-site  Platform used for Video Visit: [facility]    Sleep Study Follow-Up Visit:    Date on this visit: 11/20/2024    Yue Rivera comes in today for follow-up of her sleep study done on 10/3/2024 at the [facility]. A diagnostic polysomnogram was performed to evaluate for loud snoring, non-refreshing sleep, daytime fatigue/sleepiness (ESS 5), crowded oropharynx, strong family history of ANGELY and co-morbid DM type 2.     Polysomnogram as interpreted by Dr. Reed  Hypopnea scoring rule used: 1A 3%.     Sleep Architecture:    The total recording time of the polysomnogram was 494.5 minutes. The total sleep time was 472.5 minutes. Sleep latency was normal at 16.1 minutes without the use of a sleep aid. REM latency was 101.0 minutes. Arousal index was normal at 16.4 arousals per hour. Sleep efficiency was normal at 95.6%. Wake after sleep onset was 5.5 minutes. The patient spent 0.1% of total sleep time in Stage N1, 57.4% in Stage N2, 19.0% in Stage N3, and 23.5% in REM. Time in REM supine was 111.0 minutes.     Respiration:  scored using 3% rules  Events ? The polysomnogram revealed a presence of 27 obstructive, 3 central, and 0 mixed apneas resulting in an apnea index of 3.8 events per hour. There were 21 obstructive hypopneas and 0 central hypopneas resulting in an obstructive hypopnea index of 2.7 and central hypopnea index of 0 events per hour. The combined apnea/hypopnea index was 6.5 events per hour (central apnea/hypopnea index was 0.4 events per hour). The REM AHI was 16.2 events per hour. The supine AHI was 7.7 events per hour. The RERA index was 3.7 events per hour.  The RDI was 10.2 events per hour.  Snoring - was reported as moderate and intermittent.  Respiratory rate and pattern - was notable for normal respiratory rate and  pattern.  Sustained Sleep Associated Hypoventilation - Transcutaneous carbon dioxide monitoring was not used, however significant hypoventilation was not suggested by oximetry   Sleep Associated Hypoxemia - (Greater than 5 minutes O2 sat at or below 88%) was not present. Baseline oxygen saturation was 96.2%. Lowest oxygen saturation was 89.0%. Time spent less than or equal to 88% was 0 minutes. Time spent less than or equal to 89% was 0.1 minutes.     Movement Activity:    Periodic Limb Activity - There were 52 PLMs during the entire study. The PLM index was 6.6 movements per hour. The PLM Arousal Index was 0.1 per hour.  REM EMG Activity - Excessive transient/sustained muscle activity was not present.  Nocturnal Behavior - Abnormal sleep related behaviors were not noted    Bruxism - None apparent.        Cardiac Summary:    The average pulse rate was 58.8 bpm. The minimum pulse rate was 50.0 bpm while the maximum pulse rate was 93.0 bpm.  Arrhythmias were not noted.      Past medical/surgical history, family history, social history, medications and allergies were reviewed.      Problem List:  Patient Active Problem List    Diagnosis Date Noted    Insomnia 10/31/2024     Priority: Medium    ANGELY (obstructive sleep apnea) - mild (AHI 6) 10/15/2024     Priority: Medium     10/3/2024 [facility] (203.0 lbs) - scored using 3% rules - AHI 6.5, RDI 10.2, Supine AHI 7.7, REM AHI 16.2, Low O2 89.0%, Time Spent <=88% 0 minutes / Time Spent <=89% 0.1 minutes.      Type 2 diabetes mellitus without complication, with long-term current use of insulin (H) 01/10/2024     Priority: Medium    Mirena IUD in place - inserted 2022 12/21/2023     Priority: Medium    Cyst of Georgiana's duct 04/02/2021     Priority: Medium    Liver cyst 11/05/2019     Priority: Medium     MRI Liver 2019 - benign appearing      Pulmonary nodules 11/05/2019     Priority: Medium     CT scan 2019, stable, small.   CXR 2023 normal      Class 1  obesity due to excess calories without serious comorbidity with body mass index (BMI) of 32.0 to 32.9 in adult 12/17/2018     Priority: Medium        Impression/Plan:    Mild obstructive sleep apnea-  Polysomnogram was reviewed in detail today with the patient.   Counseled the patient that mild sleep apnea is not felt to significantly increase long-term cardiovascular risk, but can contribute to excessive daytime sleepiness.    Treatment options discussed today including no treatment, positional therapy, auto-CPAP or oral appliance therapy .  Elected treatment with auto-CPAP 6-16 cm/H20  .    She will follow up with me in about 3 month(s).     Alphonse Simons PA-C  Sleep Medicine

## 2024-11-20 NOTE — PROGRESS NOTES
Patient was offered choice of vendor and chose [facility].  Patient Yue Rivera was set up at [facility] on November 20, 2024. Patient received a Resmed Airsense 11 Pressures were set at  5-15 cm H2O.   Patient s ramp is 5 cm H2O for Auto and FLEX/EPR is EPR, 3.  Patient received a Resmed Mask name: AirFit N20 Nasal mask size Medium, heated tubing and heated humidifier.  Patient has the following compliance requirements: none.  Patient has a follow up on 11/20/24 with HARRIS Montez

## 2024-11-25 ENCOUNTER — DOCUMENTATION ONLY (OUTPATIENT)
Dept: SLEEP MEDICINE | Facility: CLINIC | Age: 39
End: 2024-11-25
Payer: COMMERCIAL

## 2024-11-25 DIAGNOSIS — G47.33 OSA (OBSTRUCTIVE SLEEP APNEA): Primary | Chronic | ICD-10-CM

## 2024-11-25 DIAGNOSIS — G47.09 OTHER INSOMNIA: ICD-10-CM

## 2024-11-25 NOTE — PROGRESS NOTES
3 day Sleep therapy management telephone visit    Diagnostic AHI:PS.5         Confirmed with patient at time of call- Yes Patient is still interested in STM service       Subjective measures:  Patient using CPAP every night for less than 4 hours. She is slowly getting used to CPAP.         Objective data     Order Settings for PAP  CPAP min     CPAP max     CPAP fixed         Device settings from machine CPAP min 5.0     CPAP max 15.0      CPAP fixed      EPR Setting THREE    RESMED soft response  ON     Assessment: Nighty usage under four hours      Patient has the following upcoming sleep appts:      Replacement device: No  STM ordered by provider: Yes     Total time spent on accessing and  interpreting remote patient PAP therapy data  10 minutes    Total time spent counseling, coaching  and reviewing PAP therapy data with patient  4 minutes    [id number] no

## 2024-11-30 ENCOUNTER — HEALTH MAINTENANCE LETTER (OUTPATIENT)
Age: 39
End: 2024-11-30

## 2025-01-30 ENCOUNTER — ANCILLARY PROCEDURE (OUTPATIENT)
Dept: MAMMOGRAPHY | Facility: CLINIC | Age: 40
End: 2025-01-30
Attending: FAMILY MEDICINE
Payer: COMMERCIAL

## 2025-01-30 DIAGNOSIS — R92.8 ABNORMAL MAMMOGRAM: ICD-10-CM

## 2025-01-30 PROCEDURE — 77066 DX MAMMO INCL CAD BI: CPT

## 2025-02-25 ENCOUNTER — OFFICE VISIT (OUTPATIENT)
Dept: FAMILY MEDICINE | Facility: CLINIC | Age: 40
End: 2025-02-25
Payer: COMMERCIAL

## 2025-02-25 ENCOUNTER — ORDERS ONLY (AUTO-RELEASED) (OUTPATIENT)
Dept: FAMILY MEDICINE | Facility: CLINIC | Age: 40
End: 2025-02-25

## 2025-02-25 VITALS
BODY MASS INDEX: 31.22 KG/M2 | TEMPERATURE: 97.6 F | OXYGEN SATURATION: 100 % | WEIGHT: 206 LBS | SYSTOLIC BLOOD PRESSURE: 123 MMHG | HEART RATE: 68 BPM | DIASTOLIC BLOOD PRESSURE: 77 MMHG | HEIGHT: 68 IN | RESPIRATION RATE: 14 BRPM

## 2025-02-25 DIAGNOSIS — R00.2 PALPITATIONS: ICD-10-CM

## 2025-02-25 DIAGNOSIS — G47.09 OTHER INSOMNIA: ICD-10-CM

## 2025-02-25 DIAGNOSIS — R91.8 PULMONARY NODULES: ICD-10-CM

## 2025-02-25 DIAGNOSIS — E66.811 CLASS 1 OBESITY DUE TO EXCESS CALORIES WITHOUT SERIOUS COMORBIDITY WITH BODY MASS INDEX (BMI) OF 32.0 TO 32.9 IN ADULT: Chronic | ICD-10-CM

## 2025-02-25 DIAGNOSIS — K76.89 LIVER CYST: ICD-10-CM

## 2025-02-25 DIAGNOSIS — Z11.59 ENCOUNTER FOR HEPATITIS C SCREENING TEST FOR LOW RISK PATIENT: ICD-10-CM

## 2025-02-25 DIAGNOSIS — Z83.49 FAMILY HISTORY OF THYROID DISEASE IN MOTHER: ICD-10-CM

## 2025-02-25 DIAGNOSIS — Z23 IMMUNIZATION DUE: ICD-10-CM

## 2025-02-25 DIAGNOSIS — Z86.39 HISTORY OF ELEVATED GLUCOSE: ICD-10-CM

## 2025-02-25 DIAGNOSIS — E66.09 CLASS 1 OBESITY DUE TO EXCESS CALORIES WITHOUT SERIOUS COMORBIDITY WITH BODY MASS INDEX (BMI) OF 32.0 TO 32.9 IN ADULT: Chronic | ICD-10-CM

## 2025-02-25 DIAGNOSIS — Z00.00 ANNUAL PHYSICAL EXAM: Primary | ICD-10-CM

## 2025-02-25 DIAGNOSIS — E80.6 CONJUGATED HYPERBILIRUBINEMIA: ICD-10-CM

## 2025-02-25 DIAGNOSIS — E80.6 HYPERBILIRUBINEMIA: ICD-10-CM

## 2025-02-25 PROBLEM — E11.9 TYPE 2 DIABETES MELLITUS WITHOUT COMPLICATION, WITH LONG-TERM CURRENT USE OF INSULIN (H): Chronic | Status: RESOLVED | Noted: 2024-01-10 | Resolved: 2025-02-25

## 2025-02-25 PROBLEM — N36.8: Status: RESOLVED | Noted: 2021-04-02 | Resolved: 2025-02-25

## 2025-02-25 PROBLEM — Z79.4 TYPE 2 DIABETES MELLITUS WITHOUT COMPLICATION, WITH LONG-TERM CURRENT USE OF INSULIN (H): Chronic | Status: RESOLVED | Noted: 2024-01-10 | Resolved: 2025-02-25

## 2025-02-25 LAB
ALBUMIN SERPL BCG-MCNC: 4.3 G/DL (ref 3.5–5.2)
ALP SERPL-CCNC: 73 U/L (ref 40–150)
ALT SERPL W P-5'-P-CCNC: 20 U/L (ref 0–50)
ANION GAP SERPL CALCULATED.3IONS-SCNC: 8 MMOL/L (ref 7–15)
AST SERPL W P-5'-P-CCNC: 21 U/L (ref 0–45)
BILIRUB DIRECT SERPL-MCNC: 0.61 MG/DL (ref 0–0.3)
BILIRUB SERPL-MCNC: 1.4 MG/DL
BUN SERPL-MCNC: 9.7 MG/DL (ref 6–20)
CALCIUM SERPL-MCNC: 9 MG/DL (ref 8.8–10.4)
CHLORIDE SERPL-SCNC: 103 MMOL/L (ref 98–107)
CHOLEST SERPL-MCNC: 133 MG/DL
CREAT SERPL-MCNC: 0.78 MG/DL (ref 0.51–0.95)
CREAT UR-MCNC: 179 MG/DL
EGFRCR SERPLBLD CKD-EPI 2021: >90 ML/MIN/1.73M2
EST. AVERAGE GLUCOSE BLD GHB EST-MCNC: 105 MG/DL
FASTING STATUS PATIENT QL REPORTED: ABNORMAL
FASTING STATUS PATIENT QL REPORTED: NORMAL
GLUCOSE SERPL-MCNC: 91 MG/DL (ref 70–99)
HBA1C MFR BLD: 5.3 % (ref 0–5.6)
HCO3 SERPL-SCNC: 25 MMOL/L (ref 22–29)
HCV AB SERPL QL IA: NONREACTIVE
HDLC SERPL-MCNC: 51 MG/DL
LDLC SERPL CALC-MCNC: 69 MG/DL
MAGNESIUM SERPL-MCNC: 2 MG/DL (ref 1.7–2.3)
MICROALBUMIN UR-MCNC: <12 MG/L
MICROALBUMIN/CREAT UR: NORMAL MG/G{CREAT}
NONHDLC SERPL-MCNC: 82 MG/DL
POTASSIUM SERPL-SCNC: 4.3 MMOL/L (ref 3.4–5.3)
PROT SERPL-MCNC: 6.7 G/DL (ref 6.4–8.3)
SODIUM SERPL-SCNC: 136 MMOL/L (ref 135–145)
TRIGL SERPL-MCNC: 67 MG/DL
TSH SERPL DL<=0.005 MIU/L-ACNC: 1.11 UIU/ML (ref 0.3–4.2)

## 2025-02-25 PROCEDURE — 83036 HEMOGLOBIN GLYCOSYLATED A1C: CPT | Performed by: FAMILY MEDICINE

## 2025-02-25 PROCEDURE — 80061 LIPID PANEL: CPT | Performed by: FAMILY MEDICINE

## 2025-02-25 PROCEDURE — 82248 BILIRUBIN DIRECT: CPT | Performed by: FAMILY MEDICINE

## 2025-02-25 PROCEDURE — 80053 COMPREHEN METABOLIC PANEL: CPT | Performed by: FAMILY MEDICINE

## 2025-02-25 PROCEDURE — 82570 ASSAY OF URINE CREATININE: CPT | Performed by: FAMILY MEDICINE

## 2025-02-25 PROCEDURE — 84443 ASSAY THYROID STIM HORMONE: CPT | Performed by: FAMILY MEDICINE

## 2025-02-25 PROCEDURE — 83735 ASSAY OF MAGNESIUM: CPT | Performed by: FAMILY MEDICINE

## 2025-02-25 PROCEDURE — 82043 UR ALBUMIN QUANTITATIVE: CPT | Performed by: FAMILY MEDICINE

## 2025-02-25 PROCEDURE — 36415 COLL VENOUS BLD VENIPUNCTURE: CPT | Performed by: FAMILY MEDICINE

## 2025-02-25 PROCEDURE — 86803 HEPATITIS C AB TEST: CPT | Performed by: FAMILY MEDICINE

## 2025-02-25 SDOH — HEALTH STABILITY: PHYSICAL HEALTH: ON AVERAGE, HOW MANY MINUTES DO YOU ENGAGE IN EXERCISE AT THIS LEVEL?: 30 MIN

## 2025-02-25 SDOH — HEALTH STABILITY: PHYSICAL HEALTH: ON AVERAGE, HOW MANY DAYS PER WEEK DO YOU ENGAGE IN MODERATE TO STRENUOUS EXERCISE (LIKE A BRISK WALK)?: 2 DAYS

## 2025-02-25 ASSESSMENT — SOCIAL DETERMINANTS OF HEALTH (SDOH): HOW OFTEN DO YOU GET TOGETHER WITH FRIENDS OR RELATIVES?: THREE TIMES A WEEK

## 2025-02-25 NOTE — PATIENT INSTRUCTIONS
Check out [email or web address] for ADHD evaluation.      Patient Education   Preventive Care Advice   This is general advice given by our system to help you stay healthy. However, your care team may have specific advice just for you. Please talk to your care team about your preventive care needs.  Nutrition  Eat 5 or more servings of fruits and vegetables each day.  Try wheat bread, brown rice and whole grain pasta (instead of white bread, rice, and pasta).  Get enough calcium and vitamin D. Check the label on foods and aim for 100% of the RDA (recommended daily allowance).  Lifestyle  Exercise at least 150 minutes each week  (30 minutes a day, 5 days a week).  Do muscle strengthening activities 2 days a week. These help control your weight and prevent disease.  No smoking.  Wear sunscreen to prevent skin cancer.  Have a dental exam and cleaning every 6 months.  Yearly exams  See your health care team every year to talk about:  Any changes in your health.  Any medicines your care team has prescribed.  Preventive care, family planning, and ways to prevent chronic diseases.  Shots (vaccines)   HPV shots (up to age 26), if you've never had them before.  Hepatitis B shots (up to age 59), if you've never had them before.  COVID-19 shot: Get this shot when it's due.  Flu shot: Get a flu shot every year.  Tetanus shot: Get a tetanus shot every 10 years.  Pneumococcal, hepatitis A, and RSV shots: Ask your care team if you need these based on your risk.  Shingles shot (for age 50 and up)  General health tests  Diabetes screening:  Starting at age 35, Get screened for diabetes at least every 3 years.  If you are younger than age 35, ask your care team if you should be screened for diabetes.  Cholesterol test: At age 39, start having a cholesterol test every 5 years, or more often if advised.  Bone density scan (DEXA): At age 50, ask your care team if you should have this scan for osteoporosis (brittle bones).  Hepatitis C: Get  tested at least once in your life.  STIs (sexually transmitted infections)  Before age 24: Ask your care team if you should be screened for STIs.  After age 24: Get screened for STIs if you're at risk. You are at risk for STIs (including HIV) if:  You are sexually active with more than one person.  You don't use condoms every time.  You or a partner was diagnosed with a sexually transmitted infection.  If you are at risk for HIV, ask about PrEP medicine to prevent HIV.  Get tested for HIV at least once in your life, whether you are at risk for HIV or not.  Cancer screening tests  Cervical cancer screening: If you have a cervix, begin getting regular cervical cancer screening tests starting at age 21.  Breast cancer scan (mammogram): If you've ever had breasts, begin having regular mammograms starting at age 40. This is a scan to check for breast cancer.  Colon cancer screening: It is important to start screening for colon cancer at age 45.  Have a colonoscopy test every 10 years (or more often if you're at risk) Or, ask your provider about stool tests like a FIT test every year or Cologuard test every 3 years.  To learn more about your testing options, visit:   .  For help making a decision, visit:   https://bit.ly/hh85093.  Prostate cancer screening test: If you have a prostate, ask your care team if a prostate cancer screening test (PSA) at age 55 is right for you.  Lung cancer screening: If you are a current or former smoker ages 50 to 80, ask your care team if ongoing lung cancer screenings are right for you.  For informational purposes only. Not to replace the advice of your health care provider. Copyright   2023 [employer]. All rights reserved. Clinically reviewed by the [facility] Transitions Program. [facility] [id number] - REV 01/24.

## 2025-02-25 NOTE — RESULT ENCOUNTER NOTE
Patient updated by [employer] message with lab results.       Chalino Turner,  Your labs have returned.  1.  Your total bilirubin level was mildly elevated though the rest of your liver enzymes looked good.  With this elevation, I added on a direct bilirubin level to see if this is an indirect or direct hyperbilirubinemia.  Your direct bilirubin level is elevated, therefore I recommend we proceed with  further evaluation with an ultrasound of the liver.  I have signed an ultrasound order.  Scheduling should reach out to help coordinate an appointment.  2.  The rest of your metabolic panel is normal.  Normal kidney function, electrolytes, blood sugar.  3.  Urine protein level normal.  4.  Cholesterol labs look great.  5.  Hepatitis C screening negative.  6.  Thyroid normal.  7.  Magnesium normal.  8.  A1c well in the normal range.  Please reach out by [employer] with any follow-up questions or concerns.

## 2025-02-25 NOTE — PROGRESS NOTES
Hyperbilirubinemia  - Bilirubin direct     Bilirubin level high. Check direct bilirubin.     Merle Alegria, DO

## 2025-02-25 NOTE — PROGRESS NOTES
Preventive Care Visit  [facility]  Merle Alegria DO, Family Medicine  Feb 25, 2025      Assessment & Plan     1. Annual physical exam (Primary)  Reviewed health history and health maintenance recommendations.  Pap and mammo up-to-date.  Will start colon cancer screening at 43.  Denies any skin concerns at this time.  Declines flu or COVID vaccines today.    2. Palpitations  - ZIO PATCH MAIL OUT; Future    Intermittent heart palpitations, thinks more related to anxiety.  Heart rate regular in clinic today.  Asymptomatic.  Proceed with Zio patch to evaluate for underlying arrhythmia versus PACs/PVCs.  Will also evaluate for electrolyte abnormalities, hypomagnesemia, thyroid abnormalities which may contribute to irregular heart beats.    3. Class 1 obesity due to excess calories without serious comorbidity with body mass index (BMI) of 32.0 to 32.9 in adult  Weight fairly stable, reviewed healthy lifestyle modifications, continue to monitor.    4. History of elevated glucose  - HEMOGLOBIN A1C  - Albumin Random Urine Quantitative with Creat Ratio  - Lipid panel reflex to direct LDL Fasting  - Comprehensive metabolic panel (BMP + Alb, Alk Phos, ALT, AST, Total. Bili, TP)    Rajendra had a single A1c greater than 6.5 though at the time she was on steroids.  Subsequent A1c's have all been in the normal range without medication management.  I do not think patient has true underlying type 2 diabetes, though she likely has some mild glucose intolerance which was exacerbated by steroid use.  Recommend we continue with annual A1c monitoring and healthy habits.  Resolve diabetes from problem list as this is not reflected in her labs.    5. Pulmonary nodules  Patient has had surveillance imaging which reveals stability and nodules.  No additional follow-up needed.    6. Other insomnia  Sleeping okay at this time, sleep interrupted with CPAP.      7. Liver cyst  Liver cysts identified on imaging, were  "stable on surveillance.  No follow-up needed.    8. Encounter for hepatitis C screening test for low risk patient  - Hepatitis C antibody    Agreeable to hepatitis C screening, low risk patient.    9. Family history of thyroid disease in mother  - TSH with free T4 reflex    Family history thyroid disease, agreeable to surveillance TSH.    10. Immunization due  - Pneumococcal 20 Valent Conjugate (Prevnar 20)          Patient has been advised of split billing requirements and indicates understanding: Yes        BMI  Estimated body mass index is 31.32 kg/m  as calculated from the following:    Height as of this encounter: 1.727 m (5' 8\").    Weight as of this encounter: 93.4 kg (206 lb).   Weight management plan: Discussed healthy diet and exercise guidelines    Counseling  Appropriate preventive services were addressed with this patient via screening, questionnaire, or discussion as appropriate for fall prevention, nutrition, physical activity, Tobacco-use cessation, social engagement, weight loss and cognition.  Checklist reviewing preventive services available has been given to the patient.  Reviewed patient's diet, addressing concerns and/or questions.   She is at risk for lack of exercise and has been provided with information to increase physical activity for the benefit of her well-being.       The longitudinal plan of care for the diagnosis(es)/condition(s) as documented were addressed during this visit. Due to the added complexity in care, I will continue to support Yue in the subsequent management and with ongoing continuity of care.     Subjective   Yue is a 39 year old, presenting for the following:  Physical        2/25/2025     9:23 AM   Additional Questions   Roomed by Brayan HUTCHINS    Annual physical exam (Primary)  - PCV: will do    - flu: defers today    - covid: defers today      - hep C: will screen today     - mammo: diagnostic mammo 1/30/25 -BI-RADS Category 3, probably benign.  " Recommend additional follow-up in 12 months with diagnostic mammogram in order to document 2 years of stability.  - pap: 9/6/22 - NILM, neg HPV    Menstrual cycles: doesn't have regular menses. Very rarely light spotting.    Mirena IUD inserted 2022.     Type 2 diabetes mellitus without complication, with long-term current use of insulin (H)  Hemoglobin A1C   Date Value Ref Range Status   02/25/2025 5.3 0.0 - 5.6 % Final     Comment:     Normal <5.7%   Prediabetes 5.7-6.4%    Diabetes 6.5% or higher     Note: Adopted from ADA consensus guidelines.   03/11/2024 5.5 0.0 - 5.6 % Final     Comment:     Normal <5.7%   Prediabetes 5.7-6.4%    Diabetes 6.5% or higher     Note: Adopted from ADA consensus guidelines.   12/21/2023 6.9 (H) 0.0 - 5.6 % Final     Comment:     Normal <5.7%   Prediabetes 5.7-6.4%    Diabetes 6.5% or higher     Note: Adopted from ADA consensus guidelines.     Glucose supplies, no medications.   Only 1 high A1c, had recently been on steroids.       Class 1 obesity due to excess calories without serious comorbidity with body mass index (BMI) of 32.0 to 32.9 in adult  Body mass index is 31.32 kg/m .  Wt Readings from Last 4 Encounters:   02/25/25 93.4 kg (206 lb)   11/20/24 91.2 kg (201 lb)   02/15/24 92.3 kg (203 lb 8 oz)   01/31/24 93 kg (205 lb)       Pulmonary nodules  Had surveillance CT in 2019, very tiny nodules, on problem list, last CXR 12/1/23 normal.       Other insomnia  Has CPAP, lasts about 3 hours, pulling off in sleep.       Liver cyst  CT scan October 2019 shows 1.5 and 0.5 cm cyst along the anterior dome of the liver.  No solid hepatic masses.  Liver otherwise had normal morphology.  Liver lesions in question on prior CT are benign cysts.  No additional follow-up needed.      Health Care Directive  Patient does not have a Health Care Directive: Discussed advance care planning with patient; however, patient declined at this time.          2/25/2025   General Health   How would you  rate your overall physical health? (!) FAIR   Feel stress (tense, anxious, or unable to sleep) Only a little   (!) STRESS CONCERN      2/25/2025   Nutrition   Three or more servings of calcium each day? Yes   Diet: Diabetic   How many servings of fruit and vegetables per day? (!) 2-3   How many sweetened beverages each day? 0-1         2/25/2025   Exercise   Days per week of moderate/strenous exercise 2 days   Average minutes spent exercising at this level 30 min   (!) EXERCISE CONCERN      2/25/2025   Social Factors   Frequency of gathering with friends or relatives Three times a week   Worry food won't last until get money to buy more No   Food not last or not have enough money for food? No   Do you have housing? (Housing is defined as stable permanent housing and does not include staying ouside in a car, in a tent, in an abandoned building, in an overnight shelter, or couch-surfing.) Yes   Are you worried about losing your housing? No   Lack of transportation? No   Unable to get utilities (heat,electricity)? No         2/25/2025   Dental   Dentist two times every year? Yes            Today's PHQ-2 Score:       2/25/2025     8:05 AM   PHQ-2 ( 1999 [employer])   Q1: Little interest or pleasure in doing things 0   Q2: Feeling down, depressed or hopeless 0   PHQ-2 Score 0    Q1: Little interest or pleasure in doing things Not at all   Q2: Feeling down, depressed or hopeless Not at all   PHQ-2 Score 0       Patient-reported           2/25/2025   Substance Use   Alcohol more than 3/day or more than 7/wk No   Do you use any other substances recreationally? No     Social History     Tobacco Use    Smoking status: Never     Passive exposure: Never    Smokeless tobacco: Never   Vaping Use    Vaping status: Never Used   Substance Use Topics    Alcohol use: No    Drug use: No           1/30/2025   LAST FHS-7 RESULTS   1st degree relative breast or ovarian cancer No   Any relative bilateral breast cancer No   Any male have breast  cancer No   Any ONE woman have BOTH breast AND ovarian cancer No   Any woman with breast cancer before 50yrs No   2 or more relatives with breast AND/OR ovarian cancer No   2 or more relatives with breast AND/OR bowel cancer No        Mammogram Screening - Mammogram every 1-2 years updated in Health Maintenance based on mutual decision making        2/25/2025   STI Screening   New sexual partner(s) since last STI/HIV test? No     History of abnormal Pap smear: No - age 30- 64 PAP with HPV every 5 years recommended        Latest Ref Rng & Units 9/6/2022     9:28 AM 12/17/2018     9:11 AM 6/25/2014    12:52 PM   PAP / HPV   PAP  Negative for Intraepithelial Lesion or Malignancy (NILM)  Negative for squamous intraepithelial lesion or malignancy  Electronically signed by Dayami Frausto CT (ASCP) on 12/22/2018 at 11:31 AM    Negative for squamous intraepithelial lesion or malignancy  Electronically signed by Dora Osuna CT (ASCP) on 7/10/2014 at  3:42 PM      HPV 16 DNA Negative Negative  Negative     HPV 18 DNA Negative Negative  Negative     Other HR HPV Negative Negative  Negative       ASCVD Risk   The 10-year ASCVD risk score (Ivan LEON, et al., 2019) is: 0.6%    Values used to calculate the score:      Age: 39 years      Sex: Female      Is Non- : No      Diabetic: Yes      Tobacco smoker: No      Systolic Blood Pressure: 123 mmHg      Is BP treated: No      HDL Cholesterol: 54 mg/dL      Total Cholesterol: 143 mg/dL        2/25/2025   Contraception/Family Planning   Questions about contraception or family planning (!) YES -         Reviewed and updated as needed this visit by Provider   Tobacco  Allergies  Meds  Problems   Surg Hx  Fam Hx           Review of Systems  Constitutional, HEENT, cardiovascular, pulmonary, GI, , musculoskeletal, neuro, skin, endocrine and psych systems are negative, except as otherwise noted.     Objective    Exam  /77   Pulse  "68   Temp 97.6  F (36.4  C) (Oral)   Resp 14   Ht 1.727 m (5' 8\")   Wt 93.4 kg (206 lb)   SpO2 100%   BMI 31.32 kg/m     Estimated body mass index is 31.32 kg/m  as calculated from the following:    Height as of this encounter: 1.727 m (5' 8\").    Weight as of this encounter: 93.4 kg (206 lb).    Physical Exam  GENERAL: alert and no distress  EYES: Eyes grossly normal to inspection, PERRL and conjunctivae and sclerae normal  HENT: ear canals and TM's normal, nose and mouth without ulcers or lesions  NECK: no adenopathy, no asymmetry, masses, or scars  RESP: lungs clear to auscultation - no rales, rhonchi or wheezes  CV: regular rate and rhythm, no murmurs   ABDOMEN: soft, nontender, no hepatosplenomegaly, no masses and bowel sounds normal  MS: no gross musculoskeletal defects noted, no edema  SKIN: no suspicious lesions or rashes  NEURO: Normal strength and tone, mentation intact and speech normal  PSYCH: mentation appears normal, affect normal/bright        Signed Electronically by: Merle Alegria, DO    "

## 2025-03-07 ENCOUNTER — HOSPITAL ENCOUNTER (OUTPATIENT)
Dept: ULTRASOUND IMAGING | Facility: HOSPITAL | Age: 40
Discharge: HOME | End: 2025-03-07
Attending: FAMILY MEDICINE | Admitting: FAMILY MEDICINE
Payer: COMMERCIAL

## 2025-03-07 DIAGNOSIS — E80.6 CONJUGATED HYPERBILIRUBINEMIA: ICD-10-CM

## 2025-03-07 PROCEDURE — 76705 ECHO EXAM OF ABDOMEN: CPT

## 2025-03-07 NOTE — RESULT ENCOUNTER NOTE
Patient updated by [employer] message with imaging results.      Chalino Turner,  Thank you for completing the ultrasound to evaluate for your elevated bilirubin level.  The ultrasound is normal.  There is an incidental simple cyst that does not require any follow-up.  With elevated conjugated bilirubin level, we should check an antimitochondrial antibody.  I will place a lab order for this.  Please schedule a lab only appointment.  Merle Alegria, DO

## 2025-03-17 ENCOUNTER — LAB (OUTPATIENT)
Dept: LAB | Facility: CLINIC | Age: 40
End: 2025-03-17
Payer: COMMERCIAL

## 2025-03-17 DIAGNOSIS — E80.6 CONJUGATED HYPERBILIRUBINEMIA: ICD-10-CM

## 2025-03-17 PROCEDURE — 86381 MITOCHONDRIAL ANTIBODY EACH: CPT

## 2025-03-17 PROCEDURE — 36415 COLL VENOUS BLD VENIPUNCTURE: CPT

## 2025-03-18 LAB — MITOCHONDRIA M2 IGG SER-ACNC: <1 U/ML

## 2025-03-18 NOTE — RESULT ENCOUNTER NOTE
Patient updated by [employer] message with lab results.      Chalino Turner,  Your mitochondrial antibody has returned normal/negative.  As your ultrasound is reassuring and your remaining liver enzymes are normal, we can continue to monitor labs at this time.  Lets plan to recheck labs in 6 months with a lab only appointment.  Reach out sooner if any concerns arise.  Merle Alegria, DO

## 2025-05-21 ENCOUNTER — TELEPHONE (OUTPATIENT)
Dept: FAMILY MEDICINE | Facility: CLINIC | Age: 40
End: 2025-05-21
Payer: COMMERCIAL

## 2025-05-21 NOTE — TELEPHONE ENCOUNTER
Reached out to patient to see is she had received the ZipPatch. She states she never got it.   At this time she does not want a new one sent out, symptoms are better. She feels it was stress related. Notified if she changes her mind to let us know and a new order can be placed.

## 2025-06-07 ENCOUNTER — HEALTH MAINTENANCE LETTER (OUTPATIENT)
Age: 40
End: 2025-06-07

## 2025-07-19 ENCOUNTER — HOSPITAL ENCOUNTER (EMERGENCY)
Facility: HOSPITAL | Age: 40
Discharge: HOME | End: 2025-07-19
Attending: EMERGENCY MEDICINE | Admitting: EMERGENCY MEDICINE
Payer: COMMERCIAL

## 2025-07-19 ENCOUNTER — OFFICE VISIT (OUTPATIENT)
Dept: OPHTHALMOLOGY | Facility: CLINIC | Age: 40
End: 2025-07-19
Payer: COMMERCIAL

## 2025-07-19 ENCOUNTER — TELEPHONE (OUTPATIENT)
Dept: OPHTHALMOLOGY | Facility: CLINIC | Age: 40
End: 2025-07-19

## 2025-07-19 VITALS
RESPIRATION RATE: 15 BRPM | TEMPERATURE: 98.5 F | WEIGHT: 218.1 LBS | DIASTOLIC BLOOD PRESSURE: 66 MMHG | HEART RATE: 74 BPM | OXYGEN SATURATION: 100 % | SYSTOLIC BLOOD PRESSURE: 118 MMHG | BODY MASS INDEX: 33.16 KG/M2

## 2025-07-19 DIAGNOSIS — H04.121 DRY EYE SYNDROME OF RIGHT EYE: ICD-10-CM

## 2025-07-19 DIAGNOSIS — H18.821 CORNEAL ABRASION DUE TO CONTACT LENS, RIGHT: ICD-10-CM

## 2025-07-19 DIAGNOSIS — H18.821 CORNEAL ABRASION OF RIGHT EYE DUE TO CONTACT LENS: Primary | ICD-10-CM

## 2025-07-19 PROCEDURE — 99283 EMERGENCY DEPT VISIT LOW MDM: CPT | Performed by: EMERGENCY MEDICINE

## 2025-07-19 PROCEDURE — 99204 OFFICE O/P NEW MOD 45 MIN: CPT | Mod: GC | Performed by: STUDENT IN AN ORGANIZED HEALTH CARE EDUCATION/TRAINING PROGRAM

## 2025-07-19 PROCEDURE — 250N000009 HC RX 250: Performed by: EMERGENCY MEDICINE

## 2025-07-19 RX ORDER — OFLOXACIN 3 MG/ML
1-2 SOLUTION/ DROPS OPHTHALMIC 3 TIMES DAILY
Qty: 5 ML | Refills: 0 | Status: SHIPPED | OUTPATIENT
Start: 2025-07-19

## 2025-07-19 RX ORDER — TETRACAINE HYDROCHLORIDE 5 MG/ML
2 SOLUTION OPHTHALMIC ONCE
Status: COMPLETED | OUTPATIENT
Start: 2025-07-19 | End: 2025-07-19

## 2025-07-19 RX ORDER — FLUORESCEIN SODIUM 1 MG/MG
1 STRIP OPHTHALMIC ONCE
Status: COMPLETED | OUTPATIENT
Start: 2025-07-19 | End: 2025-07-19

## 2025-07-19 RX ADMIN — TETRACAINE HYDROCHLORIDE 2 DROP: 5 SOLUTION OPHTHALMIC at 06:18

## 2025-07-19 RX ADMIN — FLUORESCEIN SODIUM 1 STRIP: 1 STRIP OPHTHALMIC at 06:18

## 2025-07-19 ASSESSMENT — VISUAL ACUITY
OU: 20/20;WITH CORRECTIVE LENSES
OU: NORMAL
OD: 20/100;WITH CORRECTIVE LENSES
OS: 20/25;WITH CORRECTIVE LENSES
OD_CC+: -2
METHOD: SNELLEN - LINEAR
OD_PH_CC: 20/20
OD_PH_CC+: -2
CORRECTION_TYPE: GLASSES
OS_CC+: -1
OS_CC: 20/20
OD_CC: 20/40

## 2025-07-19 ASSESSMENT — TONOMETRY
OD_IOP_MMHG: 10
OS_IOP_MMHG: 10
IOP_METHOD: ICARE

## 2025-07-19 ASSESSMENT — CONF VISUAL FIELD
OS_SUPERIOR_TEMPORAL_RESTRICTION: 0
OS_INFERIOR_TEMPORAL_RESTRICTION: 0
OS_INFERIOR_NASAL_RESTRICTION: 0
OS_NORMAL: 1
OD_INFERIOR_NASAL_RESTRICTION: 0
OD_NORMAL: 1
METHOD: COUNTING FINGERS
OS_SUPERIOR_NASAL_RESTRICTION: 0
OD_INFERIOR_TEMPORAL_RESTRICTION: 0
OD_SUPERIOR_NASAL_RESTRICTION: 0
OD_SUPERIOR_TEMPORAL_RESTRICTION: 0

## 2025-07-19 ASSESSMENT — COLUMBIA-SUICIDE SEVERITY RATING SCALE - C-SSRS
2. HAVE YOU ACTUALLY HAD ANY THOUGHTS OF KILLING YOURSELF IN THE PAST MONTH?: NO
1. IN THE PAST MONTH, HAVE YOU WISHED YOU WERE DEAD OR WISHED YOU COULD GO TO SLEEP AND NOT WAKE UP?: NO
6. HAVE YOU EVER DONE ANYTHING, STARTED TO DO ANYTHING, OR PREPARED TO DO ANYTHING TO END YOUR LIFE?: NO

## 2025-07-19 ASSESSMENT — ACTIVITIES OF DAILY LIVING (ADL)
ADLS_ACUITY_SCORE: 41
ADLS_ACUITY_SCORE: 41

## 2025-07-19 NOTE — PROGRESS NOTES
Ophthalmology [facility]     Chief Complaint(s) and History of Present Illness(es)       Corneal Abrasion Evaluation    In right eye.  Duration of 1 day.  Associated symptoms include eye pain, blurred vision, redness, photophobia and tearing.  Negative for discharge.             Comments    Here for corneal abrasion right eye. Ctx wearer. VA is blurry. Some pain, redness, and photophobia. No discharge but some tearing. No issues with the left eye.    Patricio Marina, COMT 10:02 AM July 19, 2025                      HPI:   Yue Rivera is a 39 year old female who presents for corneal abrasion right eye. Patient had scratchy sensation in her right eye during the day while wearing her contact. She woke up overnight with burning sensation in the eye. Went to wash her eye out with a cold wash cloth. She then noticed she had a film over her vision and presented to the ED, triaged to clinic this morning.       Lab Results   Component Value Date    A1C 5.3 02/25/2025    A1C 5.5 03/11/2024    A1C 6.9 12/21/2023    A1C 5.6 07/28/2016       Past Ocular history:   - Glasses:yes  - Contact lens wear: uses biweekly contacts, is good with changing them on time  - Ocular Surgical History: no  - Current Eye drops: no    PMH: none    FH: None     Review of systems for the eyes was negative other than the pertinent positives/negatives listed in the HPI.      Imaging:   None    Assessment & Plan      Yue Rivera is a 39 year old female with the following diagnoses:   1. Corneal abrasion of right eye due to contact lens    2. Dry eye syndrome of right eye       Patient with eye irritation yesterday with  contact lenses in that worsened overnight with burning sensation and blurry vision. Did not sleep in contact lenses. Exam without sukh corneal abrasion, but 3+ PEE centrally.     Plan:  - Artificial tears 4-6x/day  - Ofloxacin TID x 3 days  - Return precautions provided  - Follow up 1 week for surface check    RTC 1 week any  general or acute: VT    Patient seen with Dr. Jacques    Thank you for entrusting us with your care  Bryan Garcia MD  Resident Physician, PGY-2  Department of Ophthalmology  07/19/25 10:12 AM    Attending Physician Attestation:  Complete documentation of historical and exam elements from today's encounter can be found in the full encounter summary report (not reduplicated in this progress note).  I personally obtained the chief complaint(s) and history of present illness.  I confirmed and edited as necessary the review of systems, past medical/surgical history, family history, social history, and examination findings as documented by others; and I examined the patient myself.  I personally reviewed the relevant tests, images, and reports as documented above.  I formulated and edited as necessary the assessment and plan and discussed the findings and management plan with the patient. - Kashmir Jacques MD

## 2025-07-19 NOTE — DISCHARGE INSTRUCTIONS
Please follow-up with the Ophthalmologist at the [facility] this morning around 9 am. They will prescribe antibiotics for you after they complete their examination.

## 2025-07-19 NOTE — ED PROVIDER NOTES
Emergency Department Encounter     Evaluation Date & Time:   2025  5:16 AM    CHIEF COMPLAINT:  Eye Problem      Triage Note:Pt states right eye pain and irritation.  Pt states wearing contacts with some irritation starting yesterday.  Pt states she removed her contacts and woke up early this morning experiencing 8/10 stinning pain and cloudy vision over right eye.      Impression and Plan       FINAL IMPRESSION:    ICD-10-CM    1. Corneal abrasion due to contact lens, right  H18.821           ED COURSE & MEDICAL DECISION MAKIN year old female, history of pulmonary nodules, ANGELY, obesity and contact lens wearer, who presents for evaluation of right eye pain.    Patient had onset of mild right eye pain / irritation yesterday, which worsened and awoke her from sleep in the middle of the night. She reports associated tearing and vision change as if a film is covering her left eye that she is unable to blink away. No injury to the eye. She does change her contacts regularly and does not sleep with them in.  No symptoms to left eye.     On exam, right eye is tearing. PERRL with normal conjunctivae and EOMI. No grossly visible foreign body under exam with Wood's lamp. There is uptake of fluorescein stain over the pupil and iris of the right eye in a circular shape suggestive of a corneal abrasion from her contact lens. Acuity is 20/100 (right eye) and 20/25 (left eye) while wearing her eyeglasses. Acuity is 20/20 both eyes together with glasses.     Given abrasion in contact lens wearer with low visual acuity, Ophthalmology consulted. Patient follows with [facility] and they were consulted, however reported that they do not take any call even for their own patients.    [facility] Ophthalmology consulted and offered to see the patient in clinic this morning - they will prescribe antibiotics after seeing her.    Patient agreeable to go to [facility] for Ophthalmology consult / exam.  Patient  "discharged with follow-up [facility] around 9am. Patient stable throughout ED course.       At the conclusion of the encounter I discussed the results of all the tests and the disposition. The questions were answered. The patient acknowledged understanding and was agreeable with the care plan.      Medical Decision Making  I discussed the care with another health care provider: Ophthalmology      MIPS (CTPE, Dental pain, Henry, Sinusitis, Asthma/COPD, Head Trauma): Not Applicable    MEDICATIONS GIVEN IN THE EMERGENCY DEPARTMENT:  Medications   tetracaine (PONTOCAINE) 0.5 % ophthalmic solution 2 drop (2 drops Right Eye $Given 7/19/25 0618)   fluorescein (FUL-LEANN) ophthalmic strip 1 strip (1 strip Right Eye $Given 7/19/25 0618)       NEW PRESCRIPTIONS STARTED AT TODAY'S ED VISIT:  Discharge Medication List as of 7/19/2025  7:01 AM          HPI     HPI     Yue Rivera is a 39 year old female, history of pulmonary nodules, ANGELY, obesity and contact lens wearer, who presents to this ED via self for evaluation of eye pain.    Patient had onset of mild right eye pain yesterday, which worsened and awoke her from sleep in the middle of the night. The pain is now stinging in nature with associated tearing of the eye. No other eye discharge. She rates the pain at an 8/10 in severity. She notes foreign body sensation, but denies welding or any other activities when she obtained a foreign body to her eye. She notes cloudy vision this morning as \"if there is a film over her eye that I cannot get to go away\". No symptoms left eye.    She follows with [facility]. She gets her contacts from the eye clinic and does remove them at night.       Medical History     Past Medical History:   Diagnosis Date    Cyst of [place]'s duct 04/02/2021    History of elevated glucose - single isolated A1c 6.9     Liver cyst 11/05/2019    Pulmonary nodules 11/05/2019       ACCU-CHEK GUIDE test strip  blood glucose monitoring (SOFTCLIX) " lancets  levonorgestrel (MIRENA) 20 MCG/DAY IUD  MULTIVITAMIN ORAL  ofloxacin (OCUFLOX) 0.3 % ophthalmic solution  Probiotic Product (FORTIFY PROBIOTIC WOMENS PO)        Physical Exam     First Vitals:  Patient Vitals for the past 24 hrs:   BP Temp Temp src Pulse Resp SpO2 Weight   07/19/25 0704 118/66 -- -- -- -- -- --   07/19/25 0505 128/70 98.5  F (36.9  C) Temporal 74 15 100 % 98.9 kg (218 lb 1.6 oz)       PHYSICAL EXAM:   Physical Exam    GENERAL: Awake, alert.  In mild acute distress due to right eye pain.   HEENT: Normocephalic, atraumatic. Right eye tearing. Pupils equal, round and reactive. Conjunctiva normal. EOMI. No grossly visible foreign body under exam with Wood's lamp. There is uptake of fluorescein stain over the pupil and iris of the right eye in a circular shape. Acuity is 20/100 (right eye) and 20/25 (left eye) while wearing her eyeglasses. Acuity is 20/20 both eyes together with glasses.   PULMONARY: No respiratory distress.       NEURO: Alert and oriented to person, place and time.  Cranial nerves grossly intact.  No focal motor deficit.  PSYCH: Normal mood and affect.      I, Weston Lebron, am serving as a scribe to document services personally performed by Isabel Saldaña MD based on my observation and the provider's statements to me. IIsabel MD attest that Weston Lebron is acting in a scribe capacity, has observed my performance of the services and has documented them in accordance with my direction.    Isabel Saldaña MD  Emergency Medicine  [facility] EMERGENCY DEPARTMENT           Isabel Saldaña MD  07/19/25 [id number]

## 2025-07-19 NOTE — TELEPHONE ENCOUNTER
[facility] Ophthalmology Telephone Triage Call Note    Received call from ED provider concerned for corneal abrasion vs other with decreased vision from contact lens user. Arrange clinic visit in Saturday clinic.    Kashmir Jacques MD  Vitreoretinal Surgery Fellow

## 2025-07-19 NOTE — ED TRIAGE NOTES
Pt states right eye pain and irritation.  Pt states wearing contacts with some irritation starting yesterday.  Pt states she removed her contacts and woke up early this morning experiencing 8/10 stinning pain and cloudy vision over right eye.